# Patient Record
Sex: FEMALE | Race: WHITE | Employment: PART TIME | URBAN - METROPOLITAN AREA
[De-identification: names, ages, dates, MRNs, and addresses within clinical notes are randomized per-mention and may not be internally consistent; named-entity substitution may affect disease eponyms.]

---

## 2020-05-13 ENCOUNTER — HOSPITAL ENCOUNTER (INPATIENT)
Age: 50
LOS: 7 days | Discharge: HOME | DRG: 753 | End: 2020-05-20
Attending: PSYCHIATRY & NEUROLOGY | Admitting: PSYCHIATRY & NEUROLOGY
Payer: MEDICAID

## 2020-05-13 ENCOUNTER — HOSPITAL ENCOUNTER (EMERGENCY)
Age: 50
Discharge: PSYCHIATRIC OR SUBSTANCE USE FACILITY | End: 2020-05-13
Attending: EMERGENCY MEDICINE
Payer: MEDICAID

## 2020-05-13 VITALS
SYSTOLIC BLOOD PRESSURE: 102 MMHG | HEIGHT: 62 IN | RESPIRATION RATE: 18 BRPM | OXYGEN SATURATION: 95 % | TEMPERATURE: 98 F | WEIGHT: 130 LBS | BODY MASS INDEX: 23.92 KG/M2 | HEART RATE: 92 BPM | DIASTOLIC BLOOD PRESSURE: 71 MMHG

## 2020-05-13 DIAGNOSIS — R45.851 DEPRESSION WITH SUICIDAL IDEATION: Primary | ICD-10-CM

## 2020-05-13 DIAGNOSIS — F32.A DEPRESSION WITH SUICIDAL IDEATION: Primary | ICD-10-CM

## 2020-05-13 DIAGNOSIS — F31.62 BIPOLAR DISORDER, CURRENT EPISODE MIXED, MODERATE (HCC): ICD-10-CM

## 2020-05-13 LAB
ALBUMIN SERPL-MCNC: 3.8 G/DL (ref 3.4–5)
ALBUMIN/GLOB SERPL: 1.2 {RATIO} (ref 0.8–1.7)
ALP SERPL-CCNC: 106 U/L (ref 45–117)
ALT SERPL-CCNC: 45 U/L (ref 13–56)
AMPHET UR QL SCN: NEGATIVE
ANION GAP SERPL CALC-SCNC: 7 MMOL/L (ref 3–18)
APPEARANCE UR: CLEAR
AST SERPL-CCNC: 39 U/L (ref 10–38)
BACTERIA URNS QL MICRO: ABNORMAL /HPF
BARBITURATES UR QL SCN: NEGATIVE
BASOPHILS # BLD: 0 K/UL (ref 0–0.1)
BASOPHILS NFR BLD: 1 % (ref 0–2)
BENZODIAZ UR QL: NEGATIVE
BILIRUB SERPL-MCNC: 0.2 MG/DL (ref 0.2–1)
BILIRUB UR QL: NEGATIVE
BUN SERPL-MCNC: 5 MG/DL (ref 7–18)
BUN/CREAT SERPL: 7 (ref 12–20)
CALCIUM SERPL-MCNC: 8.4 MG/DL (ref 8.5–10.1)
CANNABINOIDS UR QL SCN: NEGATIVE
CHLORIDE SERPL-SCNC: 106 MMOL/L (ref 100–111)
CO2 SERPL-SCNC: 25 MMOL/L (ref 21–32)
COCAINE UR QL SCN: POSITIVE
COLOR UR: YELLOW
COVID-19 RAPID TEST, COVR: NOT DETECTED
CREAT SERPL-MCNC: 0.69 MG/DL (ref 0.6–1.3)
DIFFERENTIAL METHOD BLD: ABNORMAL
EOSINOPHIL # BLD: 0.1 K/UL (ref 0–0.4)
EOSINOPHIL NFR BLD: 2 % (ref 0–5)
EPITH CASTS URNS QL MICRO: ABNORMAL /LPF (ref 0–5)
ERYTHROCYTE [DISTWIDTH] IN BLOOD BY AUTOMATED COUNT: 14.6 % (ref 11.6–14.5)
ETHANOL SERPL-MCNC: 98 MG/DL (ref 0–3)
GLOBULIN SER CALC-MCNC: 3.3 G/DL (ref 2–4)
GLUCOSE SERPL-MCNC: 85 MG/DL (ref 74–99)
GLUCOSE UR STRIP.AUTO-MCNC: NEGATIVE MG/DL
HCG UR QL: NEGATIVE
HCT VFR BLD AUTO: 38.1 % (ref 35–45)
HDSCOM,HDSCOM: ABNORMAL
HGB BLD-MCNC: 12.4 G/DL (ref 12–16)
HGB UR QL STRIP: ABNORMAL
KETONES UR QL STRIP.AUTO: NEGATIVE MG/DL
LEUKOCYTE ESTERASE UR QL STRIP.AUTO: NEGATIVE
LYMPHOCYTES # BLD: 1.8 K/UL (ref 0.9–3.6)
LYMPHOCYTES NFR BLD: 31 % (ref 21–52)
MCH RBC QN AUTO: 29.4 PG (ref 24–34)
MCHC RBC AUTO-ENTMCNC: 32.5 G/DL (ref 31–37)
MCV RBC AUTO: 90.3 FL (ref 74–97)
METHADONE UR QL: NEGATIVE
MONOCYTES # BLD: 0.4 K/UL (ref 0.05–1.2)
MONOCYTES NFR BLD: 6 % (ref 3–10)
NEUTS SEG # BLD: 3.4 K/UL (ref 1.8–8)
NEUTS SEG NFR BLD: 60 % (ref 40–73)
NITRITE UR QL STRIP.AUTO: NEGATIVE
OPIATES UR QL: NEGATIVE
PCP UR QL: NEGATIVE
PH UR STRIP: 5.5 [PH] (ref 5–8)
PLATELET # BLD AUTO: 278 K/UL (ref 135–420)
PMV BLD AUTO: 8.9 FL (ref 9.2–11.8)
POTASSIUM SERPL-SCNC: 3.3 MMOL/L (ref 3.5–5.5)
PROT SERPL-MCNC: 7.1 G/DL (ref 6.4–8.2)
PROT UR STRIP-MCNC: NEGATIVE MG/DL
RBC # BLD AUTO: 4.22 M/UL (ref 4.2–5.3)
RBC #/AREA URNS HPF: ABNORMAL /HPF (ref 0–5)
SODIUM SERPL-SCNC: 138 MMOL/L (ref 136–145)
SOURCE, COVRS: NORMAL
SP GR UR REFRACTOMETRY: 1.01 (ref 1–1.03)
UROBILINOGEN UR QL STRIP.AUTO: 0.2 EU/DL (ref 0.2–1)
WBC # BLD AUTO: 5.6 K/UL (ref 4.6–13.2)
WBC URNS QL MICRO: ABNORMAL /HPF (ref 0–4)

## 2020-05-13 PROCEDURE — 81001 URINALYSIS AUTO W/SCOPE: CPT

## 2020-05-13 PROCEDURE — 74011250637 HC RX REV CODE- 250/637: Performed by: EMERGENCY MEDICINE

## 2020-05-13 PROCEDURE — 85025 COMPLETE CBC W/AUTO DIFF WBC: CPT

## 2020-05-13 PROCEDURE — 80053 COMPREHEN METABOLIC PANEL: CPT

## 2020-05-13 PROCEDURE — 80307 DRUG TEST PRSMV CHEM ANLYZR: CPT

## 2020-05-13 PROCEDURE — 99285 EMERGENCY DEPT VISIT HI MDM: CPT

## 2020-05-13 PROCEDURE — 81025 URINE PREGNANCY TEST: CPT

## 2020-05-13 PROCEDURE — 65220000003 HC RM SEMIPRIVATE PSYCH

## 2020-05-13 PROCEDURE — 87635 SARS-COV-2 COVID-19 AMP PRB: CPT

## 2020-05-13 PROCEDURE — 74011250637 HC RX REV CODE- 250/637: Performed by: PSYCHIATRY & NEUROLOGY

## 2020-05-13 RX ORDER — BENZTROPINE MESYLATE 1 MG/1
1 TABLET ORAL
Status: DISCONTINUED | OUTPATIENT
Start: 2020-05-13 | End: 2020-05-20 | Stop reason: HOSPADM

## 2020-05-13 RX ORDER — OLANZAPINE 5 MG/1
5 TABLET ORAL EVERY EVENING
Status: DISCONTINUED | OUTPATIENT
Start: 2020-05-13 | End: 2020-05-13 | Stop reason: HOSPADM

## 2020-05-13 RX ORDER — FLUPHENAZINE HYDROCHLORIDE 2.5 MG/ML
5 INJECTION, SOLUTION INTRAMUSCULAR
Status: DISCONTINUED | OUTPATIENT
Start: 2020-05-13 | End: 2020-05-20 | Stop reason: HOSPADM

## 2020-05-13 RX ORDER — FLUPHENAZINE HYDROCHLORIDE 5 MG/1
5 TABLET ORAL
Status: DISCONTINUED | OUTPATIENT
Start: 2020-05-13 | End: 2020-05-20 | Stop reason: HOSPADM

## 2020-05-13 RX ORDER — TRAZODONE HYDROCHLORIDE 50 MG/1
50 TABLET ORAL
Status: DISCONTINUED | OUTPATIENT
Start: 2020-05-13 | End: 2020-05-13 | Stop reason: HOSPADM

## 2020-05-13 RX ORDER — LORAZEPAM 1 MG/1
1 TABLET ORAL
Status: DISCONTINUED | OUTPATIENT
Start: 2020-05-13 | End: 2020-05-13 | Stop reason: HOSPADM

## 2020-05-13 RX ORDER — HYDROXYZINE PAMOATE 25 MG/1
25-50 CAPSULE ORAL
Status: DISCONTINUED | OUTPATIENT
Start: 2020-05-13 | End: 2020-05-13

## 2020-05-13 RX ORDER — IBUPROFEN 400 MG/1
800 TABLET ORAL
Status: DISCONTINUED | OUTPATIENT
Start: 2020-05-13 | End: 2020-05-20 | Stop reason: HOSPADM

## 2020-05-13 RX ORDER — HYDROXYZINE PAMOATE 50 MG/1
50 CAPSULE ORAL
Status: DISCONTINUED | OUTPATIENT
Start: 2020-05-13 | End: 2020-05-20 | Stop reason: HOSPADM

## 2020-05-13 RX ORDER — BENZTROPINE MESYLATE 1 MG/ML
1 INJECTION INTRAMUSCULAR; INTRAVENOUS
Status: DISCONTINUED | OUTPATIENT
Start: 2020-05-13 | End: 2020-05-20 | Stop reason: HOSPADM

## 2020-05-13 RX ADMIN — LORAZEPAM 1 MG: 1 TABLET ORAL at 11:14

## 2020-05-13 RX ADMIN — HYDROXYZINE PAMOATE 50 MG: 50 CAPSULE ORAL at 18:19

## 2020-05-13 NOTE — H&P
History and Physical        Patient: Jose Daniel Kumar               Sex: female          DOA: 5/13/2020         YOB: 1970      Age:  48 y.o.        LOS:  LOS: 0 days        HPI:     Jose Daniel Kumar is a 48 y.o.  female who was admitted expereincing depression   and suicidal ideation after an argument with family members. Active Problems:    Depression (5/13/2020)        Past Medical History:   Diagnosis Date    Anemia     Bipolar 1 disorder ([facility] [address] [facility]     Depression        No past surgical history on file. No family history on file. Social History     Socioeconomic History    Marital status: UNKNOWN     Spouse name: Not on file    Number of children: Not on file    Years of education: Not on file    Highest education level: Not on file   Tobacco Use    Smoking status: Never Smoker    Smokeless tobacco: Never Used   Substance and Sexual Activity    Alcohol use: Yes    Drug use: Yes     Types: Cocaine    Sexual activity: Never   Social History Narrative    ** Merged History Encounter **            Prior to Admission medications    Not on File       Allergies   Allergen Reactions    Erythromycin Nausea and Vomiting    Erythromycin Nausea Only       Review of Systems  A comprehensive review of systems was negative except for that written in the History of Present Illness. Physical Exam:      Visit Vitals  /73 (BP 1 Location: Left arm, BP Patient Position: Sitting)   Pulse (!) 107   Temp 97.7 °F (36.5 °C)   Resp 18       Physical Exam:  Physical Exam:   General:  Alert, cooperative, no distress, appears stated age. Eyes:  Conjunctivae/corneas clear. PERRL, EOMs intact. Fundi benign   Ears:  Normal TMs and external ear canals both ears. Nose: Nares normal. Septum midline. Mucosa normal. No drainage or sinus tenderness.    Mouth/Throat: Lips, mucosa, and tongue normal. Teeth and gums normal.   Neck: Supple, symmetrical, trachea midline, no adenopathy, thyroid: no enlargement/tenderness/nodules, no carotid bruit and no JVD. Back:   Symmetric, no curvature. ROM normal. No CVA tenderness. Lungs:   Clear to auscultation bilaterally. Heart:  Regular rate and rhythm, S1, S2 normal, no murmur, click, rub or gallop. Abdomen:   Soft, non-tender. Bowel sounds normal. No masses,  No organomegaly. Extremities: Extremities normal, atraumatic, no cyanosis or edema. Pulses: 2+ and symmetric all extremities. Skin: Skin color, texture, turgor normal. No rashes or lesions   Lymph nodes: Cervical, supraclavicular, and axillary nodes normal.   Neurologic: CNII-XII intact. Normal strength, sensation and reflexes throughout. Assessment/Plan     Patient was pleasant and cooperative during exam. Plan is for patient to participate in unit activities,  Take medications as prescribed and follow all discharge orders.

## 2020-05-13 NOTE — PROGRESS NOTES
Called [facility] and spoke with Albina. States just came out of meeting and will call back. Called [facility] ad spoke with Fede Coker and states doesn't accept straight Medicaid.

## 2020-05-13 NOTE — PROGRESS NOTES
Call placed to [facility] and referred pt, spoke with Gabby Hensley. Pt information faxed.

## 2020-05-13 NOTE — ED NOTES
History: Patient sinus exam  Patient stable awake alert no distress in the emergency department awaiting psychiatric consultation    Discussed information with a psychiatrist at 9 AM  He requested patient get Zyprexa and also be admitted voluntarily    ---      Vitals:  Patient Vitals for the past 12 hrs:   Temp Pulse Resp BP SpO2   05/13/20 1256  92 18 102/71 95 %   05/13/20 1107 98 °F (36.7 °C) (!) 110 18 105/67 99 %   05/13/20 0829 98.1 °F (36.7 °C) (!) 105 18 108/67 97 %   05/13/20 0455 97.4 °F (36.3 °C) (!) 104 18 (!) 141/104 98 %       Medications ordered:   Medications   lurasidone (LATUDA) tablet 40 mg (40 mg Oral Refused 5/13/20 1329)   LORazepam (ATIVAN) tablet 1 mg (1 mg Oral Given 5/13/20 1114)   traZODone (DESYREL) tablet 50 mg (has no administration in time range)   OLANZapine (ZyPREXA) tablet 5 mg (has no administration in time range)         Progress notes, Consult notes or Re-evaluation:   I evaluate the patient at 9:07 AM  Patient is awake alert no distress no vomiting no diarrhea no chest pain or shortness of breath  She is agreeable with the transfer she wanted to make sure that we knew that the [place] [address] psychiatric facility could not keep her more than 3 days but she needed to stay longer. I will communicate that with the case management as well  Displacement consultation has been placed patient seen this morning. Patient is calm awake alert cooperative no distress no other additional medication requested  Placed Zyprexa has been ordered for tonight patient is still here in our emergency department.   ----    Diagnostic Study Results     Labs -     Recent Results (from the past 12 hour(s))   CBC WITH AUTOMATED DIFF    Collection Time: 05/13/20  5:20 AM   Result Value Ref Range    WBC 5.6 4.6 - 13.2 K/uL    RBC 4.22 4.20 - 5.30 M/uL    HGB 12.4 12.0 - 16.0 g/dL    HCT 38.1 35.0 - 45.0 %    MCV 90.3 74.0 - 97.0 FL    MCH 29.4 24.0 - 34.0 PG    MCHC 32.5 31.0 - 37.0 g/dL    RDW 14.6 (H) 11.6 - 14.5 %    PLATELET 042 404 - 638 K/uL    MPV 8.9 (L) 9.2 - 11.8 FL    NEUTROPHILS 60 40 - 73 %    LYMPHOCYTES 31 21 - 52 %    MONOCYTES 6 3 - 10 %    EOSINOPHILS 2 0 - 5 %    BASOPHILS 1 0 - 2 %    ABS. NEUTROPHILS 3.4 1.8 - 8.0 K/UL    ABS. LYMPHOCYTES 1.8 0.9 - 3.6 K/UL    ABS. MONOCYTES 0.4 0.05 - 1.2 K/UL    ABS. EOSINOPHILS 0.1 0.0 - 0.4 K/UL    ABS. BASOPHILS 0.0 0.0 - 0.1 K/UL    DF AUTOMATED     METABOLIC PANEL, COMPREHENSIVE    Collection Time: 05/13/20  5:20 AM   Result Value Ref Range    Sodium 138 136 - 145 mmol/L    Potassium 3.3 (L) 3.5 - 5.5 mmol/L    Chloride 106 100 - 111 mmol/L    CO2 25 21 - 32 mmol/L    Anion gap 7 3.0 - 18 mmol/L    Glucose 85 74 - 99 mg/dL    BUN 5 (L) 7.0 - 18 MG/DL    Creatinine 0.69 0.6 - 1.3 MG/DL    BUN/Creatinine ratio 7 (L) 12 - 20      GFR est AA >60 >60 ml/min/1.73m2    GFR est non-AA >60 >60 ml/min/1.73m2    Calcium 8.4 (L) 8.5 - 10.1 MG/DL    Bilirubin, total 0.2 0.2 - 1.0 MG/DL    ALT (SGPT) 45 13 - 56 U/L    AST (SGOT) 39 (H) 10 - 38 U/L    Alk.  phosphatase 106 45 - 117 U/L    Protein, total 7.1 6.4 - 8.2 g/dL    Albumin 3.8 3.4 - 5.0 g/dL    Globulin 3.3 2.0 - 4.0 g/dL    A-G Ratio 1.2 0.8 - 1.7     ETHYL ALCOHOL    Collection Time: 05/13/20  5:20 AM   Result Value Ref Range    ALCOHOL(ETHYL),SERUM 98 (H) 0 - 3 MG/DL   URINALYSIS W/ RFLX MICROSCOPIC    Collection Time: 05/13/20  5:20 AM   Result Value Ref Range    Color YELLOW      Appearance CLEAR      Specific gravity 1.008 1.005 - 1.030      pH (UA) 5.5 5.0 - 8.0      Protein Negative NEG mg/dL    Glucose Negative NEG mg/dL    Ketone Negative NEG mg/dL    Bilirubin Negative NEG      Blood TRACE (A) NEG      Urobilinogen 0.2 0.2 - 1.0 EU/dL    Nitrites Negative NEG      Leukocyte Esterase Negative NEG     DRUG SCREEN, URINE    Collection Time: 05/13/20  5:20 AM   Result Value Ref Range    BENZODIAZEPINES Negative NEG      BARBITURATES Negative NEG      THC (TH-CANNABINOL) Negative NEG      OPIATES Negative NEG PCP(PHENCYCLIDINE) Negative NEG      COCAINE Positive (A) NEG      AMPHETAMINES Negative NEG      METHADONE Negative NEG      HDSCOM (NOTE)    URINE MICROSCOPIC ONLY    Collection Time: 05/13/20  5:20 AM   Result Value Ref Range    WBC 0 to 3 0 - 4 /hpf    RBC 0 to 3 0 - 5 /hpf    Epithelial cells FEW 0 - 5 /lpf    Bacteria 2+ (A) NEG /hpf   SARS-COV-2    Collection Time: 05/13/20 11:15 AM   Result Value Ref Range    Specimen source Nasopharyngeal      COVID-19 rapid test Not detected NOTD         Radiologic Studies -   No orders to display     CT Results  (Last 48 hours)    None        CXR Results  (Last 48 hours)    None        4:36 PM  Patient awake alert no distress patient's transfer form to [facility] has been filled out patient is awaiting transportation. No complaints well-appearing no distress positive p.o. intake in the emergency department    Discharge     Clinical Impression:   1. Depression with suicidal ideation    2.  Bipolar disorder, current episode mixed, moderate (HCC)        Disposition:  Transfer to psychiatric facility, voluntary admission    Follow-up Information    None

## 2020-05-13 NOTE — PROGRESS NOTES
Pt accepted by Dr Harry Myers at [facility]. Pt is going to Room 113 Bed 2. Nursing to call report to [phone]. ED MD, charge nurse, pt's nurse made aware. Tried to let pt know but sleep, pt was given Ativan earlier.

## 2020-05-13 NOTE — ED TRIAGE NOTES
Hx BiPolar disorder, off medication x 48hrs. Thoughts of driving car off the road. Looking for voluntary admission for rehab. Reports self medicating. Denies HI.

## 2020-05-13 NOTE — ED PROVIDER NOTES
Nathaniel Temple is a 48 y.o. female with history of bipolar disorder who is been having to include a plan of driving her car wreck left the last couple days along with decreased sleeping increased depression. Patient has been off her medications for the last 48 hours as well. She also admits to alcohol and other drug use. Patient has not been admitted in a while. She denies any hallucinations. Patient was on Latuda at 40 mg but thought that was too high dose because it made her shake. Patient is also recently prescribed trazodone but has not started taking that is yet. Patient is requesting evaluation for possible admission    The history is provided by the patient and medical records. Past Medical History:   Diagnosis Date    Anemia     [facility]     Depression        History reviewed. No pertinent surgical history. History reviewed. No pertinent family history. Social History     Socioeconomic History    Marital status: UNKNOWN     Spouse name: Not on file    Number of children: Not on file    Years of education: Not on file    Highest education level: Not on file   Occupational History    Not on file   Social Needs    Financial resource strain: Not on file    Food insecurity     Worry: Not on file     Inability: Not on file    Transportation needs     Medical: Not on file     Non-medical: Not on file   Tobacco Use    Smoking status: Never Smoker    Smokeless tobacco: Never Used   Substance and Sexual Activity    Alcohol use:  Yes    Drug use: Yes     Types: Cocaine    Sexual activity: Never   Lifestyle    Physical activity     Days per week: Not on file     Minutes per session: Not on file    Stress: Not on file   Relationships    Social connections     Talks on phone: Not on file     Gets together: Not on file     Attends Buddhist service: Not on file     Active member of club or organization: Not on file     Attends meetings of clubs or organizations: Not on file     Relationship status: Not on file    Intimate partner violence     Fear of current or ex partner: Not on file     Emotionally abused: Not on file     Physically abused: Not on file     Forced sexual activity: Not on file   Other Topics Concern    Not on file   Social History Narrative    ** Merged History Encounter **              ALLERGIES: Erythromycin and Erythromycin    Review of Systems   Constitutional: Negative for fever. HENT: Negative for sore throat and trouble swallowing. Eyes: Negative for visual disturbance. Respiratory: Negative for shortness of breath. Cardiovascular: Negative for chest pain. Genitourinary: Negative for difficulty urinating. Musculoskeletal: Negative for gait problem. Skin: Negative for rash. Allergic/Immunologic: Negative for immunocompromised state. Neurological: Negative for syncope. Psychiatric/Behavioral: Positive for sleep disturbance and suicidal ideas. The patient is nervous/anxious. Vitals:    05/13/20 0455   BP: (!) 141/104   Pulse: (!) 104   Resp: 18   Temp: 97.4 °F (36.3 °C)   SpO2: 98%   Weight: 59 kg (130 lb)   Height: 5' 2\" (1.575 m)            Physical Exam  Vitals signs and nursing note reviewed. Constitutional:       General: She is in acute distress. Appearance: She is well-developed. She is not ill-appearing, toxic-appearing or diaphoretic. HENT:      Head: Normocephalic and atraumatic. Right Ear: External ear normal.      Left Ear: External ear normal.      Nose: Nose normal.      Mouth/Throat:      Pharynx: Uvula midline. Eyes:      General: No scleral icterus. Conjunctiva/sclera: Conjunctivae normal.   Neck:      Musculoskeletal: Neck supple. Cardiovascular:      Rate and Rhythm: Normal rate and regular rhythm. Heart sounds: Normal heart sounds. Pulmonary:      Effort: Pulmonary effort is normal.      Breath sounds: Normal breath sounds. Abdominal:      Palpations: Abdomen is soft. Tenderness: There is no abdominal tenderness. Skin:     General: Skin is warm and dry. Capillary Refill: Capillary refill takes less than 2 seconds. Neurological:      Mental Status: She is alert and oriented to person, place, and time. Gait: Gait normal.   Psychiatric:         Attention and Perception: Attention normal.         Mood and Affect: Mood is anxious and depressed. Speech: Speech normal.         Behavior: Behavior normal. Behavior is cooperative. Thought Content: Thought content includes suicidal ideation. Thought content includes suicidal plan. MDM       Procedures  Vitals:  Patient Vitals for the past 12 hrs:   Temp Pulse Resp BP SpO2   05/13/20 0455 97.4 °F (36.3 °C) (!) 104 18 (!) 141/104 98 %         Medications ordered:   Medications   OTHER(NON-FORMULARY) 20 mg (has no administration in time range)   LORazepam (ATIVAN) tablet 1 mg (has no administration in time range)   traZODone (DESYREL) tablet 50 mg (has no administration in time range)         Lab findings:  No results found for this or any previous visit (from the past 12 hour(s)). EKG interpretation by ED Physician:      X-Ray, CT or other radiology findings or impressions:  No orders to display       Progress notes, Consult notes or additional Procedure notes: We will get screening labs and order medications  Will turn over to Dr. Ashli Yu at approximate 6 AM to follow-up on labs and to get tele-psychiatry evaluation    Reevaluation of patient:   Stable    Disposition:  Diagnosis:   1. Depression with suicidal ideation    2. Bipolar disorder, current episode mixed, moderate (HCC)        Disposition: Pending    Follow-up Information    None           Patient's Medications   Start Taking    No medications on file   Continue Taking    No medications on file   These Medications have changed    No medications on file   Stop Taking    FERROUS FUMARATE 325 MG (106 MG IRON) TAB    Take  by mouth.  3 times daily    HYDROXYZINE (VISTARIL) 25 MG CAPSULE    Take 25 mg by mouth three (3) times daily as needed for Itching. LITHIUM 300 MG TABLET    Take 1 Tab by mouth two (2) times a day. LITHIUM CARBONATE PO    Take  by mouth. RISPERIDONE (RISPERDAL) 1 MG TABLET    Take 1 Tab by mouth nightly. RISPERIDONE (RISPERDAL) 1 MG TABLET    Take  by mouth daily.      s

## 2020-05-13 NOTE — PROGRESS NOTES
Referred pt to [facility] and spoke to Juma Martell. Pt information faxed.

## 2020-05-13 NOTE — ED NOTES
TRANSFER - OUT REPORT:    Verbal report given to Eleanor(name) on Mariana Gandhi  being transferred to [facility]) for routine progression of care       Report consisted of patients Situation, Background, Assessment and   Recommendations(SBAR). Information from the following report(s) SBAR, ED Summary and MAR was reviewed with the receiving nurse. Lines:       Opportunity for questions and clarification was provided.       Patient awaiting transport

## 2020-05-13 NOTE — ED NOTES
Received report from the nurse, patient is sleeping, sitter in the room recording 15 minute checks.  Will obtain VS at 0900

## 2020-05-13 NOTE — CONSULTS
Name: Melinda Childers      : 1970  Date: 2020      Time: 8:30a  Location of patient: [facility] ED  Location of doctor: [facility]   This evaluation was conducted via telepsychiatry with the assistance of onsite staff    Chief Complaint: SI with plan to drive car off bridge  History of Present Illness: 47 y/o female, living in [facility] for cocaine abuse, unemployed, h/o bipolar d/o, h/o inpatient, h/o SI but denies attempts, denies h/o HI/violence, who presents to ED reporting increased depression and SI with plan to drive car off bridge in the context of relapse on cocaine, issues with family, and worsening depression. Patient reports feeling like an external force was pushing me to drive off a bridge.  Patient reports she is motivated for treatment and recently restarted Latuda but could not take the full dose because of muscle cramping. Patient does not feel safe for discharge and would like to be stabilized on psych unit and then follow up with 30 or 60-day rehab.   SI/attempts/Self harm: recent SI  last with plans to drive car of a bridge; no h/o attempts  HI/Violence: denies  Trauma history: denies  Sex/human trafficking: denies all  Access to guns: denies  Legal: denies  Psychiatric History/Treatment History: h/o bipolar; h/o inpatient   Drug/Alcohol History: abusing cocaine; no h/o rehab  Medical History:   Medications & Freq: Latuda Rx for 40mg  but taking 10-20mg  Allergies: erythromycin  Sleep: Quantity: 8h Quality: reports sleeping well at [facility] house  Family Psych History/History of suicide: no attempts; mat GM - bipolar  Social History: has been living in [facility]   Relationship status: single  no relationship   Employment: unemployed   Education: college grad   Stressors: severe  estranged from family   Strengths/supports: motivated for treatment  Mental Status Exam:   Appearance and attire: unkempt, hospital attire  Attitude and behavior: cooperative  Speech: normal rate and rhythm  Affect and mood: depressed, hopeless, helpless  Association and thought processes: circumstantial but logical  Thought content: denies current SI, SI last night with plan to drive car off bridge; no HI; reports feeling like an external force was pushing me to drive off a bridge  Perception: denies AH/VH  Sensorium, memory, and orientation: awake and alert  Intellectual functioning: average  Insight and judgment: impaired  Impression/Risk Assessment: 49 y/o female, living in correction house for cocaine abuse, unemployed, h/o bipolar d/o, h/o inpatient, h/o SI but denies attempts, denies h/o HI/violence, who presents to ED reporting increased depression and SI with plan to drive car off bridge in the context of relapse on cocaine, issues with family, and worsening depression. Patient remains hopeless, helpless, depressed, and unable to contract for safety. Patient appears at increased risk to harm self and requires inpatient psych admission for safety and stabilization.   Diagnosis: bipolar d/o depressed with possible psychotic features; cocaine use d/o  Treatment Recommendations: voluntary inpatient psych admission  dual diagnosis if available  Pharmacological: if patient in ED overnight then would start Zyprexa 5mg qhs  Therapy: supportive and substance abuse focused  Level of Care: inpatient

## 2020-05-13 NOTE — PROGRESS NOTES
Received a call from [facility] Mann and states that Dr Loulou Martines accepted pt pending COVID testing. Blaise Spring, ED director, will talk to ED MD and pt's nurse.

## 2020-05-14 PROBLEM — F31.5 BIPOLAR I DISORDER, MOST RECENT EPISODE DEPRESSED, SEVERE WITH PSYCHOTIC FEATURES (HCC): Chronic | Status: ACTIVE | Noted: 2020-05-13

## 2020-05-14 PROBLEM — F10.10 ALCOHOL USE DISORDER, MILD, ABUSE: Chronic | Status: ACTIVE | Noted: 2020-05-14

## 2020-05-14 PROBLEM — F14.20 COCAINE USE DISORDER, SEVERE, DEPENDENCE (HCC): Chronic | Status: ACTIVE | Noted: 2020-05-14

## 2020-05-14 PROBLEM — N92.1 MENORRHAGIA WITH IRREGULAR CYCLE: Chronic | Status: ACTIVE | Noted: 2020-05-14

## 2020-05-14 PROBLEM — E87.6 HYPOKALEMIA: Status: ACTIVE | Noted: 2020-05-14

## 2020-05-14 LAB
CHOLEST SERPL-MCNC: 130 MG/DL
HBA1C MFR BLD: 4.9 % (ref 4.2–5.6)
HDLC SERPL-MCNC: 65 MG/DL (ref 40–60)
HDLC SERPL: 2 {RATIO} (ref 0–5)
LDLC SERPL CALC-MCNC: 49.8 MG/DL (ref 0–100)
LIPID PROFILE,FLP: ABNORMAL
TRIGL SERPL-MCNC: 76 MG/DL (ref ?–150)
TSH SERPL DL<=0.05 MIU/L-ACNC: 0.57 UIU/ML (ref 0.36–3.74)
VLDLC SERPL CALC-MCNC: 15.2 MG/DL

## 2020-05-14 PROCEDURE — 80061 LIPID PANEL: CPT

## 2020-05-14 PROCEDURE — 83036 HEMOGLOBIN GLYCOSYLATED A1C: CPT

## 2020-05-14 PROCEDURE — 84443 ASSAY THYROID STIM HORMONE: CPT

## 2020-05-14 PROCEDURE — 74011250637 HC RX REV CODE- 250/637: Performed by: PSYCHIATRY & NEUROLOGY

## 2020-05-14 PROCEDURE — 36415 COLL VENOUS BLD VENIPUNCTURE: CPT

## 2020-05-14 PROCEDURE — 65220000003 HC RM SEMIPRIVATE PSYCH

## 2020-05-14 RX ORDER — POTASSIUM CHLORIDE 750 MG/1
10 TABLET, FILM COATED, EXTENDED RELEASE ORAL DAILY
Status: DISCONTINUED | OUTPATIENT
Start: 2020-05-14 | End: 2020-05-15

## 2020-05-14 RX ORDER — NALTREXONE HYDROCHLORIDE 50 MG/1
50 TABLET, FILM COATED ORAL DAILY
Status: DISCONTINUED | OUTPATIENT
Start: 2020-05-16 | End: 2020-05-20 | Stop reason: HOSPADM

## 2020-05-14 RX ORDER — BUPROPION HYDROCHLORIDE 100 MG/1
100 TABLET, EXTENDED RELEASE ORAL DAILY
Status: DISCONTINUED | OUTPATIENT
Start: 2020-05-15 | End: 2020-05-20 | Stop reason: HOSPADM

## 2020-05-14 RX ADMIN — POTASSIUM CHLORIDE 10 MEQ: 750 TABLET, FILM COATED, EXTENDED RELEASE ORAL at 19:52

## 2020-05-14 RX ADMIN — HYDROXYZINE PAMOATE 50 MG: 50 CAPSULE ORAL at 18:37

## 2020-05-14 NOTE — BSMART NOTE
[facility] Biopsychosocial Assessment Current Level of Psychosocial Functioning  
 
[x]Independent 
[]Dependent []Minimal Assist 
 
 
Comments:   
 
Psychosocial High Risk Factors (check all that apply) []Unable to obtain meds []Chronic illness/pain   
[x]Substance abuse  
[x]Lack of Family Support [x]Financial stress [x]Isolation []Inadequate Community Resources 
[]Suicide attempt(s) [x]Not taking medications []Victim of crime []Developmental Delay 
[x]Unable to manage personal needs []Age 72 or older  
[x]  Homeless []Chanda transportation []Readmission within 30 days [x]Unemployment []Traumatic Event Psychiatric Advanced Directive: Not clarified. Family to involve in treatment: Estranged from most of them Sexual Orientation:  heterosexual 
 
Patient Strengths: college grad, articulate / bright, still has motivation to change & get back in track Patient Barriers: inconsistency, expectations / plan not always that clear needs to recommit to \"recovery\" Opiate education provided: will be addressed in all phases of the program 
 
Safety plan: needs to reestablish trust, plan must be specific, pt must be compliant CMHC/MH history: see Dr, Crisis & nursing admission note: 
 
Plan of Care: 
medication management, group/individual therapies, family meetings, psycho -education, treatment team meetings to assist with stabilization Initial Discharge Plan:  Comply with [facility] where ever she may be staying Clinical Summary:  Pt is a 48 y.o. female with history of bipolar disorder who is been having a plan to harm self to include driving her car off a bridge the last couple days along with decreased sleeping increased depression.   Patient has been off her medications for the last 48 hours as well.  She also admits to alcohol and relapse with cocaine as well as family issues. Patient has not been admitted in a while. She denies any hallucinations. Patient was on Latuda at 40 mg but thought that was too high dose because it made her shake. Patient is also recently prescribed trazodone but has not started taking that is yet. She was staying at a [facility]. Assessment / Intervention; When introducing self to pt and explaining my role on tx team, pt was lethargic, resting in bed. Information provided was consistent with all above. No new self disclosure. Reminded pt to attend groups & activities to provide her support, relief & direction. Dr & tx team updated

## 2020-05-14 NOTE — GROUP NOTE
JAIME  GROUP DOCUMENTATION INDIVIDUAL Group Therapy Note Date: 5/14/2020 Group Start Time: 3219 Group End Time: 1640 Group Topic: Nursing [facility] ADULT CHEM Saadia Patten, RN 
 
JAIME  GROUP DOCUMENTATION GROUP Group Therapy Note Attendees: 3 Attendance: Did not attend Toni Sumner RN

## 2020-05-14 NOTE — GROUP NOTE
JAIME  GROUP DOCUMENTATION INDIVIDUAL Group Therapy Note Date: 5/13/2020 Group Start Time: 200 Group End Time: 1900 Group Topic: Nursing [facility] ADULT CHEM DEP Joselin Singer, RN 
 
JAIME  GROUP DOCUMENTATION GROUP Group Therapy Note Attendees: 5 Attendance: Did not attend group. Hannah Garrison

## 2020-05-14 NOTE — BSMART NOTE
OCCUPATIONAL THERAPY PROGRESS NOTE Group [id number] The patient declined group. Said she was still trying to get her \"head together\" and didn't feel well.

## 2020-05-14 NOTE — BH NOTES
Patient medicated with Vistaril 50mg po for visible signs of anxiety ; restlessness and notable hand tremors.

## 2020-05-14 NOTE — BSMART NOTE
SOCIAL WORK GROUP THERAPY PROGRESS NOTE Group Time:  10:30am 
 
Group Topic:  Coping Skills Group Participation:  
 
Pt reportedly did not attend group due to medical issues as she remains lethargic & wanted \"to sleep more\".

## 2020-05-14 NOTE — BH NOTES
Pt in milieu most of the shift although moments of  being loud byut not disruptive. Did participate in groups held this morning and was in room napping   this  afternoon.

## 2020-05-14 NOTE — BH NOTES
This 48year old female, admitted on voluntary status from [facility] with Hx of Bipolar Disorder. Patient accompanied to ACDU by hospital  and medics. Patient is currently expressing suicidal ideations with plan to drive her car off of the road. Patient states she is interested in substance treatment for cocaine use. She reports she was living in a [facility] house but was late returning at assigned time and was released, and is now homeless. Patient reports having a history of depression and has been off of medications for a few days. Patient is pleasant with dull affect, cooperative, and reports being able to \"incorporate the thoughts of others within her thoughts\". States she feels safe on the unit and contracts for safety. Patient denies HI/VH. Oriented to unit policies and to assigned room. Will continue to monitor and offer support as needed. RNs will initiate, develop, implement, review or revise treatment plan.

## 2020-05-15 PROCEDURE — 65220000003 HC RM SEMIPRIVATE PSYCH

## 2020-05-15 PROCEDURE — 74011250637 HC RX REV CODE- 250/637: Performed by: PSYCHIATRY & NEUROLOGY

## 2020-05-15 RX ORDER — LOPERAMIDE HYDROCHLORIDE 2 MG/1
2 CAPSULE ORAL
Status: DISCONTINUED | OUTPATIENT
Start: 2020-05-15 | End: 2020-05-20 | Stop reason: HOSPADM

## 2020-05-15 RX ORDER — THERA TABS 400 MCG
1 TAB ORAL DAILY
Status: DISCONTINUED | OUTPATIENT
Start: 2020-05-16 | End: 2020-05-20 | Stop reason: HOSPADM

## 2020-05-15 RX ADMIN — LURASIDONE HYDROCHLORIDE 20 MG: 20 TABLET, FILM COATED ORAL at 17:06

## 2020-05-15 RX ADMIN — HYDROXYZINE PAMOATE 50 MG: 50 CAPSULE ORAL at 17:36

## 2020-05-15 RX ADMIN — POTASSIUM CHLORIDE 10 MEQ: 750 TABLET, FILM COATED, EXTENDED RELEASE ORAL at 09:00

## 2020-05-15 RX ADMIN — MULTIPLE VITAMINS W/ MINERALS TAB 1 TABLET: TAB at 09:00

## 2020-05-15 RX ADMIN — LOPERAMIDE HYDROCHLORIDE 2 MG: 2 CAPSULE ORAL at 19:38

## 2020-05-15 NOTE — PROGRESS NOTES
Problem: Suicide  Goal: *STG: Remains safe in hospital  Description: Patient will not harm herself or others daily while in hospital.   Outcome: Progressing Towards Goal     Problem: Suicide  Goal: *STG/LTG: Complies with medication therapy  Description: Patient will comply with medications daily while hospitalized. Outcome: Progressing Towards Goal     Problem: Depressed Mood (Adult/Pediatric)  Goal: *STG: Participates in treatment plan  Description: Patient will participate in treatment planning daily while hospitalized. Outcome: Progressing Towards Goal   Patient appears anxious and guarded but does smile appropriately in conversation. Isolating  in room most of evening shift. Out of room for meals and medications. Reports feelings of depression. Denies +SI/HI/AH. Contracts for safety on unit. Will continue to monitor and offer support as needed.

## 2020-05-15 NOTE — BSMART NOTE
ART THERAPY GROUP PROGRESS NOTE Group time:8:45 The patient did not awaken/get up when called for group despite encouragement.

## 2020-05-15 NOTE — GROUP NOTE
[facility] GROUP DOCUMENTATION INDIVIDUAL Group Therapy Note Date: 5/15/2020 Group Start Time: 0066 Group End Time: 4787 Group Topic: Nursing [address] ADULT CHEM DEP Kaylan Graham RN 
 
[facility] GROUP DOCUMENTATION GROUP Group Therapy Note Attendees:   5 Attendance: DID NOT ATTEND Omid Cedillo RN

## 2020-05-15 NOTE — BSMART NOTE
SOCIAL WORK GROUP THERAPY PROGRESS NOTE Group Time:  10:30am 
 
Group Topic:  Coping Skills Group Participation: Pt was unavailable for group as continues to remain dysphoric and isolate in room despite reminders groups are helpful to establish realistic goals. Very little self disclosure.

## 2020-05-15 NOTE — BSMART NOTE
SW Contact:   
 
Clinical Summary:  Pt is a 48 y.o. female with history of bipolar disorder who is been having a plan to harm self to include driving her car off a bridge the last couple days along with decreased sleeping increased depression.  Patient has been off her medications for the last 48 hours as well.  She also admits to alcohol and relapse with cocaine as well as family issues. Natalya Monterroso has not been admitted in a while.  She denies any hallucinations.  Patient was on Latuda at 40 mg but thought that was too high dose because it made her shake.  Patient is also recently prescribed trazodone but has not started taking that is yet. She was staying at a [employer]. Also recently involved with [facility] for medication management. Assessment /Intervention; Today's contact was further clarity about information obtained from all assessments to help structure stronger tx plan. Pt still seems to minimize  / rationalize her need for groups & activities. Reminded her she needs to put energy into solutions & working a strong \"recovery\" program. Still vague about what willing to do outpt tx wise. ADDENDUM:  Pt given Fact Sheets & Phone #'s for: [facility]. .. \"[facility] [place]\" NOT TAKE pt insurance. Pt given task to INITIATE CONTACT this weekend & give update to her Dr. Frank & tx team given update.

## 2020-05-15 NOTE — PROGRESS NOTES
Behavioral Health Progress Note    Admit Date: 5/13/2020  Hospital day 2    Vitals : No data found. Labs:  No results found for this or any previous visit (from the past 24 hour(s)).   Meds:   Current Facility-Administered Medications   Medication Dose Route Frequency    [START ON 5/16/2020] therapeutic multivitamin (THERAGRAN) tablet 1 Tab  1 Tab Oral DAILY    lurasidone (LATUDA) tablet 20 mg  20 mg Oral DAILY WITH DINNER    buPROPion SR (WELLBUTRIN SR) tablet 100 mg  100 mg Oral DAILY    [START ON 5/16/2020] naltrexone (DEPADE) tablet 50 mg  50 mg Oral DAILY    hydrOXYzine pamoate (VISTARIL) capsule 50 mg  50 mg Oral TID PRN    ibuprofen (MOTRIN) tablet 800 mg  800 mg Oral Q6H PRN    benztropine (COGENTIN) injection 1 mg  1 mg IntraMUSCular Q6H PRN    benztropine (COGENTIN) tablet 1 mg  1 mg Oral Q6H PRN    fluPHENAZine (PROLIXIN) injection 5 mg  5 mg IntraMUSCular Q6H PRN    fluPHENAZine (PROLIXIN) tablet 5 mg  5 mg Oral Q6H PRN      Hospital Problems: Principal Problem:    Bipolar I disorder, most recent episode depressed, severe with psychotic features ([facility] (5/13/2020)    Active Problems:    Cocaine use disorder, severe, dependence ([facility] [address] [facility] (5/14/2020)      Alcohol use disorder, mild, abuse (5/14/2020)      Menorrhagia with irregular cycle (5/14/2020)      Hypokalemia (5/14/2020)        Subjective:   Medication side effects: dizziness/lightheadedness, fatigue/weakness  tremor    Mental Status Exam  Sensorium: alert  Orientation: only aware of  time, place and person  Relations: cooperative  Eye Contact: appropriate  Appearance: shows no evidence of impairment  Thought Process: normal rate of thoughts and fair abstract reasoning/computation   Thought Content: no evidence of impairment   Suicidal: passive ideas   Homicidal: none   Mood: is anxious and unhappy   Affect: stable  Memory: shows no evidence of impairment     Concentration: fair  Abstraction: concrete  Insight: The patient's insight shows no evidence of impairment    OR Fair  Judgement: is psychologically impaired OR  Fair    Assessment/Plan:   not changed  Was nauseous with diarrhea this am. Had not received Bupropion , was not available until tomorrow. Only received MVit with iron and potassium. Will stop the potassium and instead give foods high in potassium and stop the iron and use regular Vit. Will start the Bupropion in am. To resume Laruda with dinner. Still anxious, shaky, unhappy. Cannot return to [facility]. Discussed this with pt and SW who will look at [facility] program referrals.    Continue close observation,

## 2020-05-15 NOTE — H&P
[facility] HISTORY AND PHYSICAL    Name:  Ivania Lainez  MR#:   [id number]  :  1970  ACCOUNT #:  [de-identified]  ADMIT DATE:  2020    IDENTIFYING DATA:  The patient is a 51-year-old single white female, resident of [place], [place], who has been living in an [address] [place] [address]. She is unemployed and insured by [facility]. BASIS FOR ADMISSION:  The patient is admitted after presentation to [facility] Emergency Department. She presents with history of bipolar disorder, saying that she is having thoughts to drive her car and wreck it. She described decreased sleep, increased depression and had been off of her medicines for bipolar disorder for the last 48 hours. She said she had been drinking and using cocaine. She was supposed to have been on Latuda 40 mg with breakfast, but felt that it was too high because it was making her shake. She had been prescribed trazodone recently but was not taking this. She was endorsing continued suicidal ideas and inability to contract for safety. She was screened by the telepsychiatrist who reported same issues, describing her to have bipolar disorder with possible psychotic features and cocaine use disorder. There are no notes as to prior admissions, though she does appear that she had been seeing psychiatrist Dr. Sierra Moe in the past.  She had presented to [facility] with suicidality and description of having been on Lexapro and lithium on 2019. She subsequently was admitted to [facility]. The patient reports history of bipolar symptoms and cocaine use for greater than 10 years. She has several past admissions to [facility] for bipolar disorder. She has been on lithium, Depakote, Geodon and Abilify that she knows of. She had been followed by Dr. Sally Hussein, psychiatrist, for several years. She had lost her insurance.   She lost her job with the bipolar disorder and cocaine use. She had forged a check for money and went to retirement for one year about 7 years ago. She got out, violated her probation, went back to retirement for 6 months. She had been using cocaine off and on thereafter. She described manic episodes with anger. irritability, agitation as well as some episodes of mild euphoria. She described ideas of reference such as believing that people on TV are talking about her own feelings. She described intermittent depressive cycles. She did know that she had never been on bupropion for depression but did recall being on trazodone 100 mg at night for sleep and depression and having been on Vistaril for anxiety. She had been hospitalized as noted above and thereafter had briefly seen a nurse practitioner, but did not have insurance and thus was only taking medicines off and on. She did know that she had been on Latuda, but it seemed to have been too strong. Most recently, she had gone to a program called [facility] about one week ago, and they did a computer evaluation by nurse practitioner who placed her back on the Lowanda Faes, but she did not get the prescription filled. She said she felt increasingly anxious and was worried about taking the Lowanda Faes. She was living in an [address] [place] [address], but was continuing to use cocaine. She had missed the curfew as a result of the cocaine use and as a result presented to [facility] in agitated condition, threatening suicide. She said she had been smoking the crack cocaine for about 10 years. Her longest period of sobriety was about 12 months. Intermittently, she had worked 12-step program, but said it did not seem to be enough to hold her sober.   She had never gone to an inpatient substance abuse treatment program.  She did attend outpatient program at [address] [place] [address], [address] for 6 months, but said it was not very successful since none of the people there were really willing to work a program.  She was drinking about 12 ounces of wine a day. She was worried that when she had stopped the Latuda 40 mg, she was having episodes where she felt that she had to move her feet back and forth and was worried about this being a side effect of stopping Latuda or a side effect of the [place]. MEDICAL HISTORY:  Significant for menorrhagia for the past 2 months, says that she has had a low iron level in the past and had been described as being anemic. She is not sexually active. She denied food or drug allergies. LABORATORY TESTING:  In the emergency room, revealed a CBC with a low normal hemoglobin 12.4 g/dL and the remainder normal; dilute urine with a specific gravity 1.008, trace blood and 0-3 wbc's; comprehensive metabolic panel with low potassium 3.3 mmol/L, minimally low calcium 8.4 mg/dL and the remainder normal; normal lipid panel; negative hCG; urine drug screen positive for cocaine; alcohol level of intoxication with a blood alcohol level 98 mg/dL. A COVID-19 test was negative. SUBSTANCE ABUSE HISTORY:  Alcohol history is as above, and she denies having significant drinking problem, though she was drinking 12 ounces of wine a day. She denied DUI or drunk in public. She denied other drugs other than the cocaine. She did not smoke. FAMILY HISTORY AND SOCIAL HISTORY:  Family history is significant for maternal grandmother with bipolar mark, but denied substance abuse in the family. She says parents a quite angry at her at this point. She has never , does not have any relationships, does not have any children. She got bachelor awards in history and business at [employer]. 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 She subsequently lost her job and had been unable to maintain one.   Most recently, she had worked part-time at [employer] but has lost that job because of not going in. She had previously lived with her parents but cannot do so now because of the use of substances. She has been told she cannot return to the [facility] because she is using substances. She may be able to return in the future. MENTAL STATUS EXAMINATION:  Revealed her to be alert, oriented white female. Eye contact was fair. Speech was fluent. Mood was depressed with a congruent affect. Thought processing was logical and goal-directed. She denied hallucinations, but she did endorse ideas of reference. She did not appear to be actively hallucinating. She endorsed suicidal ideas, but continued saying that she did not really want to kill herself but was feeling helpless and hopeless. She denied homicidal ideas. IQ was estimated in the normal range. Insight and judgment were influenced by her depressive symptoms and by her drug use. ASSESSMENT:  AXIS I:  Bipolar I disorder, most recent episode depressed, severe with psychosis. Cocaine use disorder, severe. Alcohol use disorder, mild. Alcohol intoxication, resolved. AXIS II:  None. AXIS III:  Menorrhagia, unknown etiology. TREATMENT PLAN:  This patient is admitted voluntarily after self-presentation to emergency room threatening suicide related to ongoing bipolar depression and cocaine use with alcohol intoxication. We will place her on Vistaril as needed and multiple vitamins with iron. She does have a mildly low potassium, we will place her on potassium chloride. She should not have significant withdrawal symptoms from the cocaine or from the alcohol. I am going to write to place her back on the Latuda but at 20 mg daily since she describes problems with the higher dose. She may be able to tolerate the higher dose, but we will start with the lower dose. We will also write for Wellbutrin  mg daily for depression. This should not precipitate a manic episode.   We will need to avoid medications that are high in serotonin. We will initiate naltrexone 50 mg daily since this can be helpful in some cases of decreasing desire to use substances. It is much better with alcohol and opioids, but there are some cases in which it has been helpful with cocaine. We will continue with individual, group and milieu therapies, art and recreation therapy, case management services, social work services, physical examination, laboratory testing as needed. Social Work will do the evaluation, and I would recommend that we try to get her into a long-term substance abuse residential program.  This is somewhat difficult at this time due to the COVID virus, but there are some programs that are accepting patients. ESTIMATED LENGTH OF STAY:  7 days. ANTICIPATED DISPOSITION:  Follow up with outpatient provider or with provider that would be taking care of individuals in a residential substance abuse program if this can be arranged. PROGNOSIS:  Fair.       Gregory Jensen MD      [id number]  D:  05/14/2020 19:19  T:  05/14/2020 20:33  JOB #:  [id number]

## 2020-05-15 NOTE — GROUP NOTE
[facility]  GROUP DOCUMENTATION INDIVIDUAL Group Therapy Note Date: 5/14/2020 Group Start Time: 2030 Group End Time: 2045 Group Topic: Nursing [facility] ADULT CHEM DEP Skye Jonas., RN 
 
[facility]  GROUP DOCUMENTATION GROUP Group Therapy Note Attendees: 3 Attendance: Did not attend  Group. Maryjane Michaud.  [facility]

## 2020-05-15 NOTE — PROGRESS NOTES
Problem: Suicide  Goal: *STG: Remains safe in hospital  Description: Patient will not harm herself or others daily while in hospital.   Outcome: Progressing Towards Goal  Goal: *STG: Attends activities and groups  Description: Patient will attend at least two groups daily while in hospital.   Outcome: Progressing Towards Goal  Goal: *STG/LTG: Complies with medication therapy  Description: Patient will comply with medications daily while hospitalized. Outcome: Progressing Towards Goal     Problem: Falls - Risk of  Goal: *Absence of Falls  Description: Document Gaudencio Martinez Fall Risk and appropriate interventions in the flowsheet daily. Patient will remain fall free during hospital stay   Outcome: Progressing Towards Goal  Note: Fall Risk Interventions:            Medication Interventions: Teach patient to arise slowly                   Problem: Depressed Mood (Adult/Pediatric)  Goal: *STG: Attends activities and groups  Description: Patient will attend at least two groups daily while hospitalized. Outcome: Progressing Towards Goal  Goal: *STG: Remains safe in hospital  Description: Patient will not harm herself daily while in hospital.   Outcome: Progressing Towards Goal     Problem: Crack/Cocaine Withdrawal  Goal: *STG: Attends activities and groups  Description: Pt will attend 3 or more groups daily. She will actively participate and list 3 negative consequences of crack cocaine use. Outcome: Progressing Towards Goal      The patient has been up at will. She has been quiet and staying in her room most of the morning. She is alert and orientated to time, place and situation. She contracts for safety. She has been pleasant and cooperative. Will continue to monitor and provide support as needed.

## 2020-05-15 NOTE — BSMART NOTE
OCCUPATIONAL THERAPY PROGRESS NOTE Group time:1400 The patient did not verbally refuse, but, did not come to group.

## 2020-05-16 ENCOUNTER — APPOINTMENT (OUTPATIENT)
Dept: GENERAL RADIOLOGY | Age: 50
DRG: 753 | End: 2020-05-16
Attending: PSYCHIATRY & NEUROLOGY
Payer: MEDICAID

## 2020-05-16 PROCEDURE — 65220000003 HC RM SEMIPRIVATE PSYCH

## 2020-05-16 PROCEDURE — 74011250637 HC RX REV CODE- 250/637: Performed by: PSYCHIATRY & NEUROLOGY

## 2020-05-16 PROCEDURE — 71046 X-RAY EXAM CHEST 2 VIEWS: CPT

## 2020-05-16 RX ADMIN — THERA TABS 1 TABLET: TAB at 08:36

## 2020-05-16 RX ADMIN — BUPROPION HYDROCHLORIDE 100 MG: 100 TABLET, FILM COATED, EXTENDED RELEASE ORAL at 08:36

## 2020-05-16 RX ADMIN — LURASIDONE HYDROCHLORIDE 20 MG: 20 TABLET, FILM COATED ORAL at 16:36

## 2020-05-16 RX ADMIN — LOPERAMIDE HYDROCHLORIDE 2 MG: 2 CAPSULE ORAL at 16:38

## 2020-05-16 RX ADMIN — HYDROXYZINE PAMOATE 50 MG: 50 CAPSULE ORAL at 18:00

## 2020-05-16 RX ADMIN — NALTREXONE HYDROCHLORIDE 50 MG: 50 TABLET, FILM COATED ORAL at 08:36

## 2020-05-16 NOTE — PROGRESS NOTES
Problem: Suicide  Goal: *STG: Remains safe in hospital  Description: Patient will not harm herself or others daily while in hospital.   Outcome: Progressing Towards Goal  Note: Patient will remain safe in the hospital daily  Goal: *STG: Attends activities and groups  Description: Patient will attend at least two groups daily while in hospital.   Outcome: Progressing Towards Goal  Note: Patient will attend scheduled activities and groups daily  Goal: *STG/LTG: Complies with medication therapy  Description: Patient will comply with medications daily while hospitalized. Outcome: Progressing Towards Goal  Note: Patient will comply with medication therapy daily    Patient has been visible and active in the day area. Patient has been medication compliant, attended groups, and also got some rest during shift. She was very happy to go outside for group and recognized the significance of getting Vit D. No PRNs required during this shift. Denies current thoughts of self harm. Will continue to monitor for safety and support.

## 2020-05-16 NOTE — BH NOTES
Pt has requested visteril for anxiety. Received 50 mg. Will continue to monitor for safety and support.

## 2020-05-16 NOTE — GROUP NOTE
[facility] GROUP DOCUMENTATION INDIVIDUAL Group Therapy Note Date: 5/16/2020 Group Start Time: 0415 Group End Time: 1300 Group Topic: Nursing [facility] 1 ADULT CHEM DEP Maria Del Rosario Lozoya, RN 
 
[facility] GROUP DOCUMENTATION GROUP Group Therapy Note Attendees: 3 Attendance: Attended Patient's Goal:  To relax Interventions/techniques: Challenged, Informed and Validated Follows Directions: Followed directions Interactions: Interacted appropriately Mental Status: Calm Behavior/appearance: Cooperative Goals Achieved: Able to listen to others and Able to give feedback to another Additional Notes:  Patient sat and engaged with peers and then decided to spend some time alone with her thoughts.  
 
Eunice Joyce RN

## 2020-05-16 NOTE — BH NOTES
Patient appears to be anxious and nervous. Patient went down to get a x-ray. Patient appears quiet and calm. Staff noticed patient talking and mumbling to herself. Patient will be monitored for safety.

## 2020-05-16 NOTE — GROUP NOTE
[facility]  GROUP DOCUMENTATION INDIVIDUAL Group Therapy Note Date: 5/15/2020 Group Start Time: 2030 Group End Time: 2045 Group Topic: Nursing [facility] ADULT CHEM DEP Crew, [facility] Group Therapy Note Attendees: 7 Attendance: Attended Patient's Goal: Interventions/techniques: Informed Follows Directions: Followed directions Interactions: Interacted appropriately Mental Status: Calm Behavior/appearance: Cooperative Goals Achieved: Able to listen to others Additional Notes:   
 
 
Hebert Maria

## 2020-05-16 NOTE — PROGRESS NOTES
Behavioral Health Progress Note    Admit Date: 5/13/2020  Hospital day 2    Vitals :   Patient Vitals for the past 8 hrs:   BP Temp Pulse Resp   05/16/20 0800 108/74 97.8 °F (36.6 °C) (!) 118 18     Labs:  No results found for this or any previous visit (from the past 24 hour(s)).   Meds:   Current Facility-Administered Medications   Medication Dose Route Frequency    therapeutic multivitamin (THERAGRAN) tablet 1 Tab  1 Tab Oral DAILY    loperamide (IMODIUM) capsule 2 mg  2 mg Oral Q4H PRN    lurasidone (LATUDA) tablet 20 mg  20 mg Oral DAILY WITH DINNER    buPROPion SR (WELLBUTRIN SR) tablet 100 mg  100 mg Oral DAILY    naltrexone (DEPADE) tablet 50 mg  50 mg Oral DAILY    hydrOXYzine pamoate (VISTARIL) capsule 50 mg  50 mg Oral TID PRN    ibuprofen (MOTRIN) tablet 800 mg  800 mg Oral Q6H PRN    benztropine (COGENTIN) injection 1 mg  1 mg IntraMUSCular Q6H PRN    benztropine (COGENTIN) tablet 1 mg  1 mg Oral Q6H PRN    fluPHENAZine (PROLIXIN) injection 5 mg  5 mg IntraMUSCular Q6H PRN    fluPHENAZine (PROLIXIN) tablet 5 mg  5 mg Oral Q6H PRN      Hospital Problems: Principal Problem:    Bipolar I disorder, most recent episode depressed, severe with psychotic features ([facility] (5/13/2020)    Active Problems:    Cocaine use disorder, severe, dependence ([facility] (5/14/2020)      Alcohol use disorder, mild, abuse (5/14/2020)      Menorrhagia with irregular cycle (5/14/2020)      Hypokalemia (5/14/2020)        Subjective:   Medication side effects: none  none    Mental Status Exam  Sensorium: alert  Orientation: only aware of  time, place and person  Relations: guarded  Eye Contact: appropriate  Appearance: shows no evidence of impairment  Thought Process: normal rate of thoughts and fair abstract reasoning/computation   Thought Content: obsessions    Suicidal: denies   Homicidal: none   Mood: is anxious and unhappy   Affect: stable  Memory: shows no evidence of impairment     Concentration: fair  Abstraction: concrete  Insight: The patient's insight shows no evidence of impairment    OR Fair  Judgement: is psychologically impaired OR  Fair    Assessment/Plan:   improved  Nurses report her to be cooperative. She has been unhappy but denying suicidal ideas. She denies withdrawal symptoms. She has been having diarrhea and had required Imodium which could have either been related to withdrawal or to the use of the potassium. The potassium was discontinued. She had spoken to [facility] [employer] and also to [employer]. They are going to call back to get further information and will need records to be sent by facsimile to them on Monday. She does require a chest x-ray free of tuberculosis. She has had a negative Covid test.  She had panic symptoms last night but was uncertain as to what the cause was other than worrying about her placement. We will continue with medications and have initiated the naltrexone to decrease desire to use her drink and have initiated Wellbutrin for depression. She has taken these this morning and denies complaints so far.   Continue close observation,

## 2020-05-16 NOTE — BH NOTES
Pt was active in activities and pleasant with staff and peers. Pt slept approximately 3 hours of the shift. Will monitor for safety.

## 2020-05-17 PROCEDURE — 74011250637 HC RX REV CODE- 250/637: Performed by: PSYCHIATRY & NEUROLOGY

## 2020-05-17 PROCEDURE — 65220000003 HC RM SEMIPRIVATE PSYCH

## 2020-05-17 RX ADMIN — THERA TABS 1 TABLET: TAB at 08:28

## 2020-05-17 RX ADMIN — BUPROPION HYDROCHLORIDE 100 MG: 100 TABLET, FILM COATED, EXTENDED RELEASE ORAL at 08:28

## 2020-05-17 RX ADMIN — HYDROXYZINE PAMOATE 50 MG: 50 CAPSULE ORAL at 22:24

## 2020-05-17 RX ADMIN — NALTREXONE HYDROCHLORIDE 50 MG: 50 TABLET, FILM COATED ORAL at 08:28

## 2020-05-17 NOTE — PROGRESS NOTES
Behavioral Health Progress Note    Admit Date: 5/13/2020  Hospital day 3    Vitals : No data found. Labs:  No results found for this or any previous visit (from the past 24 hour(s)). Meds:   Current Facility-Administered Medications   Medication Dose Route Frequency    therapeutic multivitamin (THERAGRAN) tablet 1 Tab  1 Tab Oral DAILY    loperamide (IMODIUM) capsule 2 mg  2 mg Oral Q4H PRN    buPROPion SR (WELLBUTRIN SR) tablet 100 mg  100 mg Oral DAILY    naltrexone (DEPADE) tablet 50 mg  50 mg Oral DAILY    hydrOXYzine pamoate (VISTARIL) capsule 50 mg  50 mg Oral TID PRN    ibuprofen (MOTRIN) tablet 800 mg  800 mg Oral Q6H PRN    benztropine (COGENTIN) injection 1 mg  1 mg IntraMUSCular Q6H PRN    benztropine (COGENTIN) tablet 1 mg  1 mg Oral Q6H PRN    fluPHENAZine (PROLIXIN) injection 5 mg  5 mg IntraMUSCular Q6H PRN    fluPHENAZine (PROLIXIN) tablet 5 mg  5 mg Oral Q6H PRN      Hospital Problems: Principal Problem:    Bipolar I disorder, most recent episode depressed, severe with psychotic features ([facility] (5/13/2020)    Active Problems:    Cocaine use disorder, severe, dependence ([facility] (5/14/2020)      Alcohol use disorder, mild, abuse (5/14/2020)      Menorrhagia with irregular cycle (5/14/2020)      Hypokalemia (5/14/2020)        Subjective:   Medication side effects: Arm stiffness cramping and whenever uses the Latuda.   none    Mental Status Exam  Sensorium: alert  Orientation: only aware of  time, place and person  Relations: cooperative  Eye Contact: appropriate  Appearance: shows no evidence of impairment  Thought Process: normal rate of thoughts and fair abstract reasoning/computation   Thought Content: no evidence of impairment   Suicidal: denies   Homicidal: none   Mood: is anxious   Affect: stable  Memory: shows no evidence of impairment     Concentration: fair  Abstraction: abstract  Insight: The patient's insight shows no evidence of impairment    OR Fair  Judgement: shows no evidence of impairment OR  Fair    Assessment/Plan:   improved  Nurses report that she has been cooperative and looking better. She says that she did not like the feeling of her arms being stiff with the Latuda and wished to come off of the Lenell Chambers. Return of any type manic symptoms we would consider using Latuda 10 mg though this is quite low. We will continue with the Wellbutrin and naltrexone. She says that she feels better with this. She did have 2 phone calls yesterday with 1 getting through to her food stamp group and the other to her family so they could make arrangements to take her car home. She wants to get to inpatient substance abuse program.  She had gotten [facility] and also to the [address] [place] [address] [employer] yesterday and both of these groups will be expecting phone call from the  as well as the information being faxed to them.   She is highly motivated to go to treatment program.  Continue close observation

## 2020-05-17 NOTE — GROUP NOTE
[facility] GROUP DOCUMENTATION INDIVIDUAL Group Therapy Note Date: 5/17/2020 Group Start Time: 7826 Group End Time: 1300 Group Topic: Recreational/Music Therapy [facility] 1 ADULT CHEM DEP Donald Estes; Saadia Gifford, RN 
 
[facility] GROUP DOCUMENTATION GROUP Group Therapy Note Attendees: 3 Attendance: Attended Patient's Goal:  Recreational group Interventions/techniques: Promoted peer support Follows Directions: Followed directions Interactions: Interacted appropriately Mental Status: Calm Behavior/appearance: Cooperative Goals Achieved: Able to engage in interactions Additional Notes:   
 
Jacqueline Hodges RN

## 2020-05-17 NOTE — PROGRESS NOTES
Problem: Suicide  Goal: *STG: Remains safe in hospital  Description: Patient will not harm herself or others daily while in hospital.   Outcome: Progressing Towards Goal  Goal: *STG/LTG: Complies with medication therapy  Description: Patient will comply with medications daily while hospitalized. Outcome: Progressing Towards Goal  Note: Patient will comply with medication therapy daily     Problem: Depressed Mood (Adult/Pediatric)  Goal: *STG: Participates in treatment plan  Description: Patient will participate in treatment planning daily while hospitalized. Outcome: Progressing Towards Goal  Note: Patient will participate in treatment plan daily  Goal: *STG: Attends activities and groups  Description: Patient will attend at least two groups daily while hospitalized. Outcome: Progressing Towards Goal  Note: Patient will attend scheduled activities and groups daily   Patient has been minimally visible and active in the day area. She has been in and out of her room. She did go to recreational group outside. She reports that she is excited about possibly going to rehab this week and requested staff inform her  of her plan. Denies current thoughts of self harm. Will continue to monitor for safety and support.

## 2020-05-17 NOTE — GROUP NOTE
JA[facility]  GROUP DOCUMENTATION INDIVIDUAL Group Therapy Note Date: 5/17/2020 Group Start Time: 46 Group End Time: 6868 Group Topic: Nursing [facility] 1 ADULT CHEM Saadia Patten RN 
 
[facility]  GROUP DOCUMENTATION GROUP Group Therapy Note Attendees: 4 Attendance: Did not attend Deidre Morales RN

## 2020-05-18 PROCEDURE — 65220000003 HC RM SEMIPRIVATE PSYCH

## 2020-05-18 PROCEDURE — 74011250637 HC RX REV CODE- 250/637: Performed by: PSYCHIATRY & NEUROLOGY

## 2020-05-18 RX ADMIN — THERA TABS 1 TABLET: TAB at 08:37

## 2020-05-18 RX ADMIN — BUPROPION HYDROCHLORIDE 100 MG: 100 TABLET, FILM COATED, EXTENDED RELEASE ORAL at 08:37

## 2020-05-18 RX ADMIN — NALTREXONE HYDROCHLORIDE 50 MG: 50 TABLET, FILM COATED ORAL at 08:37

## 2020-05-18 RX ADMIN — HYDROXYZINE PAMOATE 50 MG: 50 CAPSULE ORAL at 21:03

## 2020-05-18 NOTE — BSMART NOTE
SOCIAL WORK GROUP THERAPY PROGRESS NOTE Group Time:  10:15am    
 
Group Topic:  Coping Skills    C D Issues Group Participation:   
 
Pt moderately involved during group discussion but remained attentive. Affect some brighter as her focus is starting to be more on \"recovery\" now. Members discussed handout on the role of \"neurotransmitters\" and how they regulate different aspects of one's moods, cognitions & related behavior. Reviewed strategies to keep a \"Journal\" for moods, cognitions, behavior & outcome. Admitted needs to do more of this.

## 2020-05-18 NOTE — BH NOTES
Pt appeared to have slept for 6.25 hours thus far. She is easily aroused during Q15 rounds. Pt asked I'd leave the door open so that she would not wake up so easily. This writer did so, but apparently her roommate closed it on a couple of occasions, which may have frustrated the Pt. Will continue to monitor for safety.

## 2020-05-18 NOTE — PROGRESS NOTES
Behavioral Health Progress Note    Admit Date: 5/13/2020  Hospital day 5    Vitals :   Patient Vitals for the past 8 hrs:   BP Temp Pulse Resp   05/18/20 0836 96/69 96.5 °F (35.8 °C) 98 18     Labs:  No results found for this or any previous visit (from the past 24 hour(s)).   Meds:   Current Facility-Administered Medications   Medication Dose Route Frequency    therapeutic multivitamin (THERAGRAN) tablet 1 Tab  1 Tab Oral DAILY    loperamide (IMODIUM) capsule 2 mg  2 mg Oral Q4H PRN    buPROPion SR (WELLBUTRIN SR) tablet 100 mg  100 mg Oral DAILY    naltrexone (DEPADE) tablet 50 mg  50 mg Oral DAILY    hydrOXYzine pamoate (VISTARIL) capsule 50 mg  50 mg Oral TID PRN    ibuprofen (MOTRIN) tablet 800 mg  800 mg Oral Q6H PRN    benztropine (COGENTIN) injection 1 mg  1 mg IntraMUSCular Q6H PRN    benztropine (COGENTIN) tablet 1 mg  1 mg Oral Q6H PRN    fluPHENAZine (PROLIXIN) injection 5 mg  5 mg IntraMUSCular Q6H PRN    fluPHENAZine (PROLIXIN) tablet 5 mg  5 mg Oral Q6H PRN      Hospital Problems: Principal Problem:    Bipolar I disorder, most recent episode depressed, severe with psychotic features ([facility] [address] [address] (5/13/2020)    Active Problems:    Cocaine use disorder, severe, dependence ([facility] [address] (5/14/2020)      Alcohol use disorder, mild, abuse (5/14/2020)      Menorrhagia with irregular cycle (5/14/2020)      Hypokalemia (5/14/2020)        Subjective:   Medication side effects: none  none    Mental Status Exam  Sensorium: alert  Orientation: only aware of  time, place and person  Relations: cooperative  Eye Contact: appropriate  Appearance: shows no evidence of impairment  Thought Process: normal rate of thoughts and fair abstract reasoning/computation   Thought Content: no evidence of impairment   Suicidal: brief ideas without intent   Homicidal: none   Mood: is anxious and is sad   Affect: stable  Memory: shows no evidence of impairment     Concentration: fair  Abstraction: concrete  Insight: The patient's insight shows no evidence of impairment    OR Fair  Judgement: is psychologically impaired OR  Fair    Assessment/Plan:   not changed  Patient has been on the Wellbutrin for several days and denies medication complaints with this. She had been getting the [place] but discontinued this and is not having the stiffness or shaking which she had been getting with that. She denies feeling manic but does feel somewhat anxious and not taking it. She denies suicidal intent but has had some suicidal ideas. She continues to be extremely anxious about the prospect of what she will do to maintain sobriety and is quite fearful that she will relapse if she heads out the door without going to a treatment facility. She says that she knows she will end up killing herself if she does not stay sober. We will continue with medications as is. The  has set her paperwork to [facility] and to the [address] [facility] [address]. We have not heard back from them as yet.   Continue close observation,

## 2020-05-18 NOTE — BH NOTES
Patient appeared anxious and nervous. Patient stayed to herself this shift. Patient used the phone several times this shift. Patient interacted with other patients

## 2020-05-18 NOTE — BSMART NOTE
OCCUPATIONAL THERAPY PROGRESS NOTE Group Time:  1400 Attendance: The patient attended full group.nce. Participation: The patient participated fully in the activity. Attention: The patient was able to focus on the activity. Interaction: The patient frequently interacts with others. Discussed hopes for rehab, possible return to [address] [place] [address] [address] after that. Identified goal for self to open up more.

## 2020-05-18 NOTE — BSMART NOTE
ART THERAPY GROUP PROGRESS NOTE Group [id number] The patient did not awaken/get up when called for group.

## 2020-05-18 NOTE — BSMART NOTE
SW Contact:   
 
Clinical Summary:  Pt is a 48 y.o. female with history of bipolar disorder who is been having a plan to harm self to include driving her car off a bridge the last couple days along with decreased sleeping increased depression.  Patient has been off her medications for the last 48 hours as well.  She also admits to alcohol and relapse with cocaine as well as family issues.  Patient has not been admitted in a while.  She denies any hallucinations.  Patient was on Latuda at 40 mg but thought that was too high dose because it made her shake.  Patient is also recently prescribed trazodone but has not started taking that is yet.  She was staying at a [employer]. Also recently involved with [facility] for medication management. Assessment /Intervention; Today's contact was UPDATE ON  information obtained from pt whose now focused more on \"RECOVERY\" & d/c plan. Pt A LOT MORE ENERGIZED. She shared contacts she made: [address] [facility] [address] #[phone]  CONTACT \"CARLOS\" FAX #[phone] [place] [facility]:. .  #                                        CONTACT \"REYAN\"   
                         [id number]  OR  [id number] PT CLINICAL FAXED TO \"BOTH\". .. CONFIRMATIONS RECEIVED ! Pt aware this was completed.  & tx team updated.

## 2020-05-19 PROCEDURE — 65220000003 HC RM SEMIPRIVATE PSYCH

## 2020-05-19 PROCEDURE — 74011250637 HC RX REV CODE- 250/637: Performed by: PSYCHIATRY & NEUROLOGY

## 2020-05-19 RX ADMIN — BUPROPION HYDROCHLORIDE 100 MG: 100 TABLET, FILM COATED, EXTENDED RELEASE ORAL at 10:04

## 2020-05-19 RX ADMIN — THERA TABS 1 TABLET: TAB at 10:04

## 2020-05-19 RX ADMIN — NALTREXONE HYDROCHLORIDE 50 MG: 50 TABLET, FILM COATED ORAL at 10:03

## 2020-05-19 NOTE — BSMART NOTE
SW Contact:    
 
Clinical Summary:  Pt is a 48 y.o. female with history of bipolar disorder who is been having a plan to harm self to include driving her car off a bridge the last couple days along with decreased sleeping increased depression.  Patient has been off her medications for the last 48 hours as well.  She also admits to alcohol and relapse with cocaine as well as family issues.  Patient has not been admitted in a while.  She denies any hallucinations.  Patient was on Latuda at 40 mg but thought that was too high dose because it made her shake.  Patient is also recently prescribed trazodone but has not started taking that is yet.  [facility] recently involved with [employer] for medication management. Assessment / Intervention: reviewed with pt update on tx programs that were applied for. Explained NOT / Reasons why not accepted to [place]. Also reminded her of pending request for [facility] and phone Interview on Thursday 5/ 21 / 20  At 3PM. ALSO explained we were researching possibility of TEMP PLACEMENT IN [facility] [address]. .. UNTIL SHE'S ACCEPTED IN PROGRAM.  Pt understood process. 1:45pm Today:  Clinical Faxed To  [facility]. .. [address]  Dr & tx team given update.

## 2020-05-19 NOTE — BSMART NOTE
SOCIAL WORK GROUP THERAPY PROGRESS NOTE Group Time:  10:30am 
 
Group Topic:  Coping Skills    C D Issues Group Participation:   
 
Pt moderately involved during group discussion but remained attentive. Affect flatter with disappointment she wasn't accepted for program in [place]. Emphasis of session was presentation of basic \"CBT\" principles including diagram of the process, as well as list of typical cognitive distortions. She identified overgeneralizing & jumping to conclusions as needing to change. Taught client about relationship between mood disorders & self medicating them.

## 2020-05-19 NOTE — GROUP NOTE
IP  GROUP DOCUMENTATION INDIVIDUAL Group Therapy Note Date: 5/19/2020 Group Start Time: 200 Group End Time: 1900 Group Topic: Nursing [facility] 1 ADULT CHEM DEP Arnett Sandhoff., RN 
 
IP  GROUP DOCUMENTATION GROUP Group Therapy Note Attendees: 4 Attendance: Attended Interventions/techniques: Challenged and Informed Follows Directions: Followed directions Interactions: Interacted appropriately Mental Status: Calm Behavior/appearance: Attentive and Cooperative Goals Achieved: Able to engage in interactions and Able to listen to others Rosa Loomis

## 2020-05-19 NOTE — PROGRESS NOTES
Problem: Suicide  Goal: *STG: Remains safe in hospital  Description: Patient will not harm herself or others daily while in hospital.   Outcome: Progressing Towards Goal     Problem: Suicide  Goal: *STG/LTG: Complies with medication therapy  Description: Patient will comply with medications daily while hospitalized. Outcome: Progressing Towards Goal     Problem: Depressed Mood (Adult/Pediatric)  Goal: *STG: Participates in treatment plan  Description: Patient will participate in treatment planning daily while hospitalized. Outcome: Progressing Towards Goal  Patient noted with bright affect; smiling appropriately in conversation. Out of room in dayroom socializing and participating in unit activities. Denies +SI/HI/AH. Contracts for safety. Compliant with medications and treatment. Will continue to support and offer reassurance.

## 2020-05-19 NOTE — PROGRESS NOTES
Problem: Suicide  Goal: *STG: Remains safe in hospital  Description: Patient will not harm herself or others daily while in hospital.   Outcome: Progressing Towards Goal     Problem: Suicide  Goal: *STG: Attends activities and groups  Description: Patient will attend at least two groups daily while in hospital.   Outcome: Progressing Towards Goal     Problem: Suicide  Goal: *STG/LTG: Complies with medication therapy  Description: Patient will comply with medications daily while hospitalized. Outcome: Progressing Towards Goal   Patient pleasant and cooperative. Smiling appropriately in conversation. States she is calling programs for treatment programs. Attending group activities with good participation. Compliant with medications. Denies feelings of self harm and appears less anxious compared to admission. Will continue to monitor and offer support as needed.

## 2020-05-19 NOTE — PROGRESS NOTES
Behavioral Health Progress Note    Admit Date: 5/13/2020  Hospital day 6    Vitals :   Patient Vitals for the past 8 hrs:   BP Temp Pulse Resp SpO2   05/19/20 0855 102/66 97.3 °F (36.3 °C) 96 20 98 %     Labs:  No results found for this or any previous visit (from the past 24 hour(s)).   Meds:   Current Facility-Administered Medications   Medication Dose Route Frequency    therapeutic multivitamin (THERAGRAN) tablet 1 Tab  1 Tab Oral DAILY    loperamide (IMODIUM) capsule 2 mg  2 mg Oral Q4H PRN    buPROPion SR (WELLBUTRIN SR) tablet 100 mg  100 mg Oral DAILY    naltrexone (DEPADE) tablet 50 mg  50 mg Oral DAILY    hydrOXYzine pamoate (VISTARIL) capsule 50 mg  50 mg Oral TID PRN    ibuprofen (MOTRIN) tablet 800 mg  800 mg Oral Q6H PRN    benztropine (COGENTIN) injection 1 mg  1 mg IntraMUSCular Q6H PRN    benztropine (COGENTIN) tablet 1 mg  1 mg Oral Q6H PRN    fluPHENAZine (PROLIXIN) injection 5 mg  5 mg IntraMUSCular Q6H PRN    fluPHENAZine (PROLIXIN) tablet 5 mg  5 mg Oral Q6H PRN      Hospital Problems: Principal Problem:    Bipolar I disorder, most recent episode depressed, severe with psychotic features ([facility] [address] (5/13/2020)    Active Problems:    Cocaine use disorder, severe, dependence ([facility] [address] (5/14/2020)      Alcohol use disorder, mild, abuse (5/14/2020)      Menorrhagia with irregular cycle (5/14/2020)      Hypokalemia (5/14/2020)        Subjective:   Medication side effects: none  none    Mental Status Exam  Sensorium: alert  Orientation: only aware of  time, place and person  Relations: cooperative  Eye Contact: appropriate  Appearance: shows no evidence of impairment  Thought Process: normal rate of thoughts and fair abstract reasoning/computation   Thought Content: no evidence of impairment   Suicidal: denies   Homicidal: none   Mood: is anxious and unhappy   Affect: stable  Memory: shows no evidence of impairment     Concentration: fair  Abstraction: abstract  Insight: The patient's insight shows no evidence of impairment    OR Fair  Judgement: shows no evidence of impairment OR  Fair    Assessment/Plan:   improved  Nurses report the patient has been cooperative. She continues to be somewhat unhappy worried about where she will go when she leaves the hospital.  She was denied by [facility] treatment [facility] since they thought that she had too much psychiatric issues for them to deal with. We have not heard back about the [address]. I have inquired as to whether she may be a candidate for the [place] crisis stabilization unit as a short-term placement. Have also not heard back in regards to the [facility] program.  She denies hallucinations or delusions. She is [place] for safety not having thoughts of suicide today. Mood was somewhat anxious and unhappy but she is functional at this point. We will continue with medications as is.   Continue close observation,

## 2020-05-19 NOTE — GROUP NOTE
JAIME  GROUP DOCUMENTATION INDIVIDUAL Group Therapy Note Date: 5/18/2020 Group Start Time: 1 Group End Time: 1945 Group Topic: Nursing [facility] 1 ADULT CHEM DEP Joselin Singer, RN 
 
IP  GROUP DOCUMENTATION GROUP Group Therapy Note Attendees: 4 Attendance: Attended Interventions/techniques: Challenged and Informed Follows Directions: Followed directions Interactions: Interacted appropriately Mental Status: Calm Behavior/appearance: Attentive Goals Achieved: Able to engage in interactions and Able to listen to others Hannah Garrison

## 2020-05-19 NOTE — BSMART NOTE
OCCUPATIONAL THERAPY PROGRESS NOTE Group time:8294 The patient declined group. Said her stomach was bothering her. Luzma Alvarez

## 2020-05-20 VITALS
OXYGEN SATURATION: 98 % | SYSTOLIC BLOOD PRESSURE: 100 MMHG | DIASTOLIC BLOOD PRESSURE: 69 MMHG | RESPIRATION RATE: 16 BRPM | TEMPERATURE: 97.2 F | HEART RATE: 76 BPM

## 2020-05-20 PROCEDURE — 74011250637 HC RX REV CODE- 250/637: Performed by: PSYCHIATRY & NEUROLOGY

## 2020-05-20 RX ORDER — BUPROPION HYDROCHLORIDE 100 MG/1
100 TABLET, EXTENDED RELEASE ORAL DAILY
Qty: 30 TAB | Refills: 0 | Status: SHIPPED | OUTPATIENT
Start: 2020-05-21 | End: 2020-06-24

## 2020-05-20 RX ORDER — IBUPROFEN 800 MG/1
800 TABLET ORAL
Qty: 45 TAB | Refills: 0 | Status: SHIPPED | OUTPATIENT
Start: 2020-05-20 | End: 2020-06-24

## 2020-05-20 RX ORDER — THERA TABS 400 MCG
1 TAB ORAL DAILY
Qty: 30 TAB | Refills: 0 | Status: SHIPPED | OUTPATIENT
Start: 2020-05-21 | End: 2020-06-24

## 2020-05-20 RX ORDER — NALTREXONE HYDROCHLORIDE 50 MG/1
50 TABLET, FILM COATED ORAL DAILY
Qty: 30 TAB | Refills: 0 | Status: SHIPPED | OUTPATIENT
Start: 2020-05-21 | End: 2020-06-24

## 2020-05-20 RX ADMIN — NALTREXONE HYDROCHLORIDE 50 MG: 50 TABLET, FILM COATED ORAL at 08:21

## 2020-05-20 RX ADMIN — THERA TABS 1 TABLET: TAB at 08:21

## 2020-05-20 RX ADMIN — BUPROPION HYDROCHLORIDE 100 MG: 100 TABLET, FILM COATED, EXTENDED RELEASE ORAL at 08:21

## 2020-05-20 NOTE — DISCHARGE INSTRUCTIONS
BEHAVIORAL HEALTH NURSING DISCHARGE NOTE      The following personal items collected during your admission are returned to you:   Dental Appliance:    Vision: Visual Aid: None  Hearing Aid:    Jewelry:    Clothing: Clothing: Pants, Shirt  Other Valuables: Other Valuables: Cell Phone, Other (comment)(phone , 2 books, sunglasses, purse)  Valuables sent to safe:        PATIENT INSTRUCTIONS:    Patient armband removed and shredded    The discharge information has been reviewed with the patient. The patient verbalized understanding.     Phone numbers to remember:  [address] : [address] [facility] Emergency [facility]:  [phone]  Suicide Prevention: [phone]

## 2020-05-20 NOTE — BH NOTES
The documentation for this period is being entered following the guidelines as defined in the [facility] downtime policy by Sherman Dc (KAYA). Pt downtime sheets are in patient chart.

## 2020-05-20 NOTE — BSMART NOTE
Pt.'s case was discussed in treatment team.  Covering SW informed the team pt. Will be discharged to [place] [id number] in [facility] today. Pt. Has a phone assessment scheduled with [facility] on 5/20/20 @ 3:00 pm. ALYCIA discussed dc with pt. Pt. denies ideations and hallucinations. Pt. informed Manuel Ohara will pick pt up this morning.

## 2020-05-20 NOTE — BH NOTES
Reviewed discharge paperwork with patient, answered all questions, and removed armband. Pt stable and in no distress. Looking forward to rehabilitation.  Accompanied downstairs to her transportation for discharge

## 2020-05-20 NOTE — BH NOTES
Patient has been in day room most of day shift, participating in groups and interacting with staff and peers. Patient has been appropriate with interactions and continues to voice wanting to attend long-term substance abuse program.  Patient voiced disappointment in denial by [place] but also voiced \"things happen for a reason. \"  Patient affect bright and appropriate during this shift. Patient performed ADL's and has been complaint with unit guidelines, free from falls and harm. Patient compliant with scheduled medications. Patient encouraged to \"keep fight'n for it, sometimes when we've been through stuff like this a few times, life makes us really work for it. .. but, that makes it more valuable, right? \"  Patient agreed and has continued to voice desire for ongoing recovery services. Will continue to monitor and provide reassurance and encouragement as appropriate.

## 2020-05-20 NOTE — DISCHARGE SUMMARY
[facility]    Name:  Raj Jenkins  MR#:   [id number]  :  1970  ACCOUNT #:  [de-identified]  ADMIT DATE:  2020  DISCHARGE DATE:  2020    IDENTIFYING DATA:  The patient had presented as a 61-year-old single white female, resident of [place], [place], who had been living in an [address]. She was unemployed and insured by [facility]. She was admitted after presentation to [facility] with a history of bipolar disorder, having thoughts to drive her car and wreck it. She had decreased sleep, increased depression, and had been off of her medicines for bipolar disorder for 48 hours. She had been drinking and using cocaine. She was supposed to be on Latuda 40 mg with breakfast, but thought it was too high and was making her shake. She had previously been prescribed trazodone and was not taking it. She had endorsed continued suicidal ideas and inability to contract for safety. She was screened by telepsychiatry and recommended admission. There were no notes about prior admissions other than notes about having seen psychiatrist, Lela Martinez MD, in the past.  She had presented to [facility] with suicidality and a description having been on Lexapro and lithium on 2019 and was admitted to [facility]. She did report bipolar symptoms, cocaine use for greater than 10 years with prior admissions to [facility]. She had been on lithium, Depakote, Geodon, and Abilify and at one time had seen Dr. Christine Mas, psychiatrist, but then lost her job and her insurance. She had forged a check and went to detention for one year about seven years ago, then violated probation and went back to detention for six months. She had a history of manic episodes of anger, irritability, and agitation with episodes of mild euphoria. At one point, she believed the TV was talking about her.   She had paulette benavidez at the [facility] and relapsed to cocaine, thereafter ended up presenting to the hospital.    Longest period of sobriety was about 12 months. She had worked 12-step program, but had not maintained sobriety. She had attended an outpatient program of probation or parole for six months, but said it was not very successful since none of the people there seemed willing to work a program.  She was drinking about 12 ounces of wine a day. Laboratory testing in the emergency room revealed a CBC with a low normal hemoglobin 12.4 g/dL with the remainder normal, dilute urine with 0-3 wbc's, mainly normal comprehensive metabolic panel with a low potassium 3.3 mmol/L, minimally low calcium 8.4 mg/dL, normal lipid panel, negative hCG, urine drug screen positive for cocaine, alcohol level showing intoxication with a blood level of 98 mg/dL. COVID-19 test was negative. A screening chest x-ray was negative for any evidence of tuberculosis or pneumonia. HOSPITAL COURSE:  The patient was admitted to the locked adult unit where she was afforded individual, group, and milieu therapies. Vital signs were normal.  While in the hospital, she was treated with the medication of potassium which caused intestinal problems and had to be discontinued. She was instead placed on one banana twice a day as well as orange juice. She was placed on bupropion  mg daily; hydroxyzine p.r.n. anxiety, receiving a total of five doses; Imodium 2 mg which she received several doses over first several days; naltrexone 50 mg daily; multiple vitamins daily. She had been tried on the [place] which made her sleepy as an outpatient, so she was placed on the lower dose at 20 mg. She continued to have difficulties with it. This was discontinued and she showed no evidence of any manic symptoms at all. She was aware that if she did have any of that, the Latuda could be resumed.   Mood improved in the structure of the hospital.  She was denying further homicidal or suicidal ideas after several days. She was voicing desire to maintain sobriety and felt the need to go to a substance abuse program in order to maintain this since she had failed as an outpatient. We had given her several referrals and the patient was to have a phone call to the [facility] in several days for her screening interview. She was no longer meeting acute care criteria and was accepted to the [address] [place] [address] Unit for further low-level stabilization. CONDITION ON DISCHARGE:  Fair. PROGNOSIS:  Fair. ASSESSMENT:  AXIS I:  Bipolar I disorder, most recent episode depressed, severe, with psychosis. Cocaine use disorder, severe. Alcohol use disorder, mild. Alcohol intoxication, resolved. AXIS II:  None. AXIS III:  Menorrhagia, unknown etiology. DISPOSITION/FOLLOWUP PLANS:  Discharged to [facility] for transfer to the [address] [place] [address] Unit. The patient has a screening interview on 05/22/2020 for evaluation and placement into the [facility] substance abuse residential treatment. It is anticipated she will follow up back with physicians in the [place] area for her psychiatric care and substance abuse treatment after residential care. Follow up with own gynecologist upon return. MEDICATIONS:  Bupropion  mg daily, #30, no refill; naltrexone 50 mg p.o. daily, #30, no refill; multiple vitamin one p.o. daily, #30, no refill.       Fadia Henson MD      [id number]  D:  05/20/2020 10:02  T:  05/20/2020 17:19  JOB #:  [id number]

## 2020-06-24 ENCOUNTER — HOSPITAL ENCOUNTER (EMERGENCY)
Age: 50
Discharge: HOME | End: 2020-06-24
Attending: EMERGENCY MEDICINE
Payer: MEDICAID

## 2020-06-24 VITALS
WEIGHT: 128 LBS | HEIGHT: 62 IN | SYSTOLIC BLOOD PRESSURE: 126 MMHG | DIASTOLIC BLOOD PRESSURE: 82 MMHG | HEART RATE: 92 BPM | RESPIRATION RATE: 16 BRPM | TEMPERATURE: 98 F | BODY MASS INDEX: 23.55 KG/M2 | OXYGEN SATURATION: 100 %

## 2020-06-24 DIAGNOSIS — R35.0 URINARY FREQUENCY: Primary | ICD-10-CM

## 2020-06-24 DIAGNOSIS — N30.01 ACUTE CYSTITIS WITH HEMATURIA: ICD-10-CM

## 2020-06-24 DIAGNOSIS — Z76.0 MEDICATION REFILL: ICD-10-CM

## 2020-06-24 LAB
APPEARANCE UR: CLEAR
BACTERIA URNS QL MICRO: ABNORMAL /HPF
BILIRUB UR QL: NEGATIVE
COLOR UR: YELLOW
EPITH CASTS URNS QL MICRO: ABNORMAL /LPF (ref 0–5)
GLUCOSE UR STRIP.AUTO-MCNC: NEGATIVE MG/DL
HCG UR QL: NEGATIVE
HGB UR QL STRIP: ABNORMAL
KETONES UR QL STRIP.AUTO: NEGATIVE MG/DL
LEUKOCYTE ESTERASE UR QL STRIP.AUTO: NEGATIVE
NITRITE UR QL STRIP.AUTO: NEGATIVE
PH UR STRIP: 7 [PH] (ref 5–8)
PROT UR STRIP-MCNC: NEGATIVE MG/DL
RBC #/AREA URNS HPF: ABNORMAL /HPF (ref 0–5)
SP GR UR REFRACTOMETRY: 1 (ref 1–1.03)
UROBILINOGEN UR QL STRIP.AUTO: 0.2 EU/DL (ref 0.2–1)
WBC URNS QL MICRO: ABNORMAL /HPF (ref 0–4)

## 2020-06-24 PROCEDURE — 87086 URINE CULTURE/COLONY COUNT: CPT

## 2020-06-24 PROCEDURE — 99283 EMERGENCY DEPT VISIT LOW MDM: CPT

## 2020-06-24 PROCEDURE — 81025 URINE PREGNANCY TEST: CPT

## 2020-06-24 PROCEDURE — 81001 URINALYSIS AUTO W/SCOPE: CPT

## 2020-06-24 RX ORDER — BUPROPION HYDROCHLORIDE 100 MG/1
100 TABLET, EXTENDED RELEASE ORAL DAILY
Qty: 30 TAB | Refills: 0 | Status: SHIPPED | OUTPATIENT
Start: 2020-06-24

## 2020-06-24 RX ORDER — CEPHALEXIN 500 MG/1
500 CAPSULE ORAL 2 TIMES DAILY
Qty: 20 CAP | Refills: 0 | Status: SHIPPED | OUTPATIENT
Start: 2020-06-24 | End: 2020-07-04

## 2020-06-24 NOTE — PROGRESS NOTES
referral to assist pt finding psychiatric provider that will take her insurance. Pt has [employer]. Placed a call to [facility] and spoke with her. She states that [facility]. Pt provided with this information with contact #. Pt was also provided with [facility] Intake information.

## 2020-06-24 NOTE — ED TRIAGE NOTES
Needs refill for bupropion and psych referral. Was at [facility] recently on psych floor

## 2020-06-24 NOTE — ED PROVIDER NOTES
EMERGENCY DEPARTMENT HISTORY AND PHYSICAL EXAM    11:16 AM      Date: 6/24/2020  Patient Name: Jarrell Cates    History of Presenting Illness     Chief Complaint   Patient presents with    Medication Refill         History Provided By: Patient    Additional History (Context): Jarrell Cates is a 48 y.o. female with hx of depression, bipolar disorder, anxiety, anemia who presents for multiple complaints. Patient requesting refill for her Wellbutrin, last dose was 2 days ago. Patient reports she has not been able to get in with a psychiatrist and this medication seems to be helping her. Patient denies any suicidal thoughts at this time, depression, homicidal ideation. Patient also complaining of frequency, reports she thinks is from her anxiety. Patient denies any low back pain, pain with urination, burning sensation. Patient denies any vaginal discharge, painful intercourse. Patient denies any shortness of breath, chest pain, dizziness, fevers, headaches. PCP: None    Current Outpatient Medications   Medication Sig Dispense Refill    buPROPion SR (WELLBUTRIN SR) 100 mg SR tablet Take 1 Tab by mouth daily. Indications: anxiousness associated with depression 30 Tab 0    cephALEXin (Keflex) 500 mg capsule Take 1 Cap by mouth two (2) times a day for 10 days. 20 Cap 0       Past History     Past Medical History:  Past Medical History:   Diagnosis Date    Anemia     Bipolar 1 disorder ([address]     Depression        Past Surgical History:  History reviewed. No pertinent surgical history. Family History:  History reviewed. No pertinent family history. Social History:  Social History     Tobacco Use    Smoking status: Never Smoker    Smokeless tobacco: Never Used   Substance Use Topics    Alcohol use: Yes    Drug use: Yes     Types: Cocaine       Allergies:   Allergies   Allergen Reactions    Erythromycin Nausea and Vomiting    Erythromycin Nausea Only         Review of Systems       Review of Systems Constitutional: Negative for chills and fever. Respiratory: Negative for shortness of breath. Cardiovascular: Negative for chest pain. Gastrointestinal: Negative for abdominal pain, nausea and vomiting. Genitourinary: Positive for frequency and urgency. Negative for decreased urine volume, difficulty urinating, dyspareunia, dysuria, pelvic pain, vaginal bleeding, vaginal discharge and vaginal pain. Skin: Negative for rash. Neurological: Negative for weakness. Psychiatric/Behavioral: Negative for behavioral problems, confusion, dysphoric mood, hallucinations, sleep disturbance and suicidal ideas. The patient is nervous/anxious. The patient is not hyperactive. All other systems reviewed and are negative. Physical Exam     Visit Vitals  /82 (BP 1 Location: Right arm, BP Patient Position: At rest)   Pulse 92   Temp 98 °F (36.7 °C)   Resp 16   Ht 5' 2\" (1.575 m)   Wt 58.1 kg (128 lb)   LMP 06/03/2020   SpO2 100%   BMI 23.41 kg/m²         Physical Exam  Vitals signs reviewed. Constitutional:       General: She is not in acute distress. Appearance: Normal appearance. She is well-developed. She is not ill-appearing or toxic-appearing. Comments: Patient in no acute distress. HENT:      Head: Normocephalic and atraumatic. Eyes:      Conjunctiva/sclera: Conjunctivae normal.      Pupils: Pupils are equal, round, and reactive to light. Neck:      Musculoskeletal: Normal range of motion. Trachea: Trachea normal.   Cardiovascular:      Rate and Rhythm: Normal rate and regular rhythm. Pulmonary:      Effort: Pulmonary effort is normal.      Breath sounds: Normal breath sounds and air entry. Abdominal:      General: Bowel sounds are normal. There is no distension or abdominal bruit. Palpations: Abdomen is soft. Abdomen is not rigid. There is no shifting dullness, fluid wave, mass or pulsatile mass. Tenderness: There is generalized abdominal tenderness.  There is rebound. There is no guarding. Negative signs include Smith's sign and McBurney's sign. Neurological:      General: No focal deficit present. Mental Status: She is alert and oriented to person, place, and time. Mental status is at baseline. Psychiatric:         Attention and Perception: Attention and perception normal. She is attentive. She does not perceive auditory or visual hallucinations. Mood and Affect: Mood normal.         Speech: Speech normal.         Behavior: Behavior normal. Behavior is cooperative. Thought Content: Thought content normal. Thought content does not include homicidal or suicidal ideation. Thought content does not include homicidal or suicidal plan. Cognition and Memory: Cognition normal.         Judgment: Judgment normal.      Comments: Patient denies any suicidal ideation. Patient calm. Diagnostic Study Results     Labs -  Recent Results (from the past 12 hour(s))   URINALYSIS W/ RFLX MICROSCOPIC    Collection Time: 06/24/20 11:30 AM   Result Value Ref Range    Color YELLOW      Appearance CLEAR      Specific gravity 1.005 1.005 - 1.030      pH (UA) 7.0 5.0 - 8.0      Protein Negative NEG mg/dL    Glucose Negative NEG mg/dL    Ketone Negative NEG mg/dL    Bilirubin Negative NEG      Blood LARGE (A) NEG      Urobilinogen 0.2 0.2 - 1.0 EU/dL    Nitrites Negative NEG      Leukocyte Esterase Negative NEG     HCG URINE, QL    Collection Time: 06/24/20 11:30 AM   Result Value Ref Range    HCG urine, QL Negative NEG     URINE MICROSCOPIC ONLY    Collection Time: 06/24/20 11:30 AM   Result Value Ref Range    WBC 4 to 11 0 - 4 /hpf    RBC 21 to 35 0 - 5 /hpf    Epithelial cells 2+ 0 - 5 /lpf    Bacteria 1+ (A) NEG /hpf       Radiologic Studies -   No orders to display         Medical Decision Making   I am the first provider for this patient.     I reviewed the vital signs, available nursing notes, past medical history, past surgical history, family history and social history. Vital Signs-Reviewed the patient's vital signs. Records Reviewed: Nursing Notes and Old Medical Records (Time of Review: 11:16 AM)    ED Course: Progress Notes, Reevaluation, and Consults:      Provider Notes (Medical Decision Making):   Plan to refill patient's Wellbutrin. Will have  see patient to help her set up psych appointment. Will check patient's urine for possible UTI. Patient has no concerns at this time for STDs. Patient denies any vaginal discharge. Patient denies any homicidal or  suicidal ideation. Patient reports the Wellbutrin is helping her. Pt is calm during visit does not look in distress. Patient given information to follow up with [facility] and given info to make appointment as soon as possible. Dawson Cheung RN from [employer]. UA: Some WBC present in urine. Patient is currently on her menstrual cycle which explains the blood in urine. Will treat pt for UTI due to symptoms of frequency. Pt to follow up with her PCP or return to ED as needed. 12:35 PM  Mariana Balderrama's  results have been reviewed with her. She has been counseled regarding her diagnosis. She verbally conveys understanding and agreement of the signs, symptoms, diagnosis, treatment and prognosis and additionally agrees to follow up as recommended with her PCP and [facility]. She also agrees with the care-plan and conveys that all of her questions have been answered. I have also put together some discharge instructions for her that include: 1) educational information regarding their diagnosis, 2) how to care for their diagnosis at home, as well a 3) list of reasons why they would want to return to the ED prior to their follow-up appointment, should their condition change. Diagnosis     Clinical Impression:   1. Urinary frequency    2. Acute cystitis with hematuria    3.  Medication refill        Disposition: home     Follow-up Information     Follow up With Specialties Details Why Contact Info    [employer]  Schedule an appointment as soon as possible for a visit today  [phone]  [phone]    [facility] EMERGENCY DEPT Emergency Medicine  If symptoms worsen [address] [facility] [place]     Natalia  Schedule an appointment as soon as possible for a visit in 1 week  [address] [facility]   As needed [address] [place] [address] E  [phone]           Patient's Medications   Start Taking    CEPHALEXIN (KEFLEX) 500 MG CAPSULE    Take 1 Cap by mouth two (2) times a day for 10 days. Continue Taking    No medications on file   These Medications have changed    Modified Medication Previous Medication    BUPROPION SR (WELLBUTRIN SR) 100 MG SR TABLET buPROPion SR (WELLBUTRIN SR) 100 mg SR tablet       Take 1 Tab by mouth daily. Indications: anxiousness associated with depression    Take 1 Tab by mouth daily. Indications: anxiousness associated with depression   Stop Taking    IBUPROFEN (MOTRIN) 800 MG TABLET    Take 1 Tab by mouth every six (6) hours as needed for Pain. Indications: pain    NALTREXONE (DEPADE) 50 MG TABLET    Take 1 Tab by mouth daily. Indications: substance abuse relapse prevention    THERAPEUTIC MULTIVITAMIN (THERAGRAN) TABLET    Take 1 Tab by mouth daily. Indications: treatment to prevent vitamin deficiency       Dictation disclaimer:  Please note that this dictation was completed with Net[employer], the computer voice recognition software. Quite often unanticipated grammatical, syntax, homophones, and other interpretive errors are inadvertently transcribed by the computer software. Please disregard these errors. Please excuse any errors that have escaped final proofreading.

## 2020-06-24 NOTE — DISCHARGE INSTRUCTIONS

## 2020-06-26 LAB
BACTERIA SPEC CULT: NORMAL
CC UR VC: NORMAL
SERVICE CMNT-IMP: NORMAL

## 2020-11-25 ENCOUNTER — HOSPITAL ENCOUNTER (EMERGENCY)
Age: 50
Discharge: HOME | End: 2020-11-25
Attending: EMERGENCY MEDICINE | Admitting: EMERGENCY MEDICINE
Payer: MEDICAID

## 2020-11-25 VITALS
HEART RATE: 101 BPM | HEIGHT: 63 IN | RESPIRATION RATE: 18 BRPM | OXYGEN SATURATION: 100 % | DIASTOLIC BLOOD PRESSURE: 100 MMHG | SYSTOLIC BLOOD PRESSURE: 133 MMHG | WEIGHT: 135 LBS | TEMPERATURE: 97.9 F | BODY MASS INDEX: 23.92 KG/M2

## 2020-11-25 DIAGNOSIS — Z76.0 ENCOUNTER FOR MEDICATION REFILL: Primary | ICD-10-CM

## 2020-11-25 PROCEDURE — 99281 EMR DPT VST MAYX REQ PHY/QHP: CPT

## 2020-11-25 NOTE — ED TRIAGE NOTES
Pt arrives ambulatory through triage for refill of medication. Pt states she has moved and has not been able to get her medication for bipolar. Was prescribed Wellbutrin. Last dose was mid sept.

## 2020-11-25 NOTE — ED PROVIDER NOTES
[facility] EMERGENCY DEPT    Date: 11/25/2020  Patient Name: Skye Nolen    History of Presenting Illness     Chief Complaint   Patient presents with    Medication Refill     48 y.o. female with a past medical history of Bipolar disorder and Depression presents to the ED requesting a medication refill. Patient states she has been off of her Wellbutrin for 1 month and is requesting a refill today. She states she does not have any suicidal ideation, homicidal ideation, or thoughts of self-harm. She does not currently have a psychiatrist and indicates that she did not follow-up with the [facility] in June when she was directed to do so. No symptoms at the current time. Patient denies any other associated signs or symptoms. Patient denies any other complaints. Nursing notes regarding the HPI and triage nursing notes were reviewed. Prior medical records were reviewed. Current Outpatient Medications   Medication Sig Dispense Refill    buPROPion SR (WELLBUTRIN SR) 100 mg SR tablet Take 1 Tab by mouth daily. Indications: anxiousness associated with depression 30 Tab 0       Past History     Past Medical History:  Past Medical History:   Diagnosis Date    Anemia     Bipolar 1 disorder ([address]     Depression        Past Surgical History:  History reviewed. No pertinent surgical history. Family History:  History reviewed. No pertinent family history. Social History:  Social History     Tobacco Use    Smoking status: Never Smoker    Smokeless tobacco: Never Used   Substance Use Topics    Alcohol use: Yes    Drug use: Yes     Types: Cocaine       Allergies: Allergies   Allergen Reactions    Erythromycin Nausea and Vomiting    Erythromycin Nausea Only       Patient's primary care provider (as noted in EPIC): Other, MD Chiquita    Review of Systems   Constitutional:  Denies malaise, fever, chills. Head:  Denies injury.    Neuro:  Denies headache, LOC, dizziness, neurologic symptoms/deficits/paresthesias. Skin: Denies injury, rash, itching or skin changes. Psych: Denies HI, SI. All other systems negative as reviewed. Visit Vitals  BP (!) 133/100 (BP 1 Location: Right arm, BP Patient Position: At rest)   Pulse (!) 101   Temp 97.9 °F (36.6 °C)   Resp 18   Ht 5' 3\" (1.6 m)   Wt 61.2 kg (135 lb)   SpO2 100%   BMI 23.91 kg/m²       PHYSICAL EXAM:    CONSTITUTIONAL:  Alert, in no apparent distress;  well developed;  well nourished. HEAD:  Normocephalic, atraumatic. EYES:  EOMI. Non-icteric sclera. Normal conjunctiva. NECK:  Supple  RESPIRATORY:  Chest clear, equal breath sounds, good air movement. CARDIOVASCULAR:  Regular rate and rhythm. No murmurs, rubs, or gallops. GI:  Normal bowel sounds, abdomen soft and non-tender. No rebound or guarding. BACK:  Non-tender. UPPER EXT:  Normal inspection. LOWER EXT:  No edema, no calf tenderness. Distal pulses intact. NEURO:  Moves all four extremities, and grossly normal motor exam.  SKIN:  No rashes;  Normal for age. PSYCH:  Alert and normal affect. Denies HI, SI.     IMPRESSION AND MEDICAL DECISION MAKING:  Based upon the patient's presentation with noted HPI and PE, along with the work up done in the emergency department, the patient is seeking a medication refill. Patient is requesting Wellbutrin today, she has been off of it for 1 month. She did not f/u with [facility] in June of this year as directed. I informed her that we are unable to prescribe this today as she has been off of it for quite some time and she needs a psychiatrist to write it for her. She became angry and asked to speak with my attending. Dr. Kerrin Babinski has discussed this with her as well and has informed her that she needs to be under the continued care of a psychiatrist for her to be on this medication. Patient is not suicidal or homicidal, she may return for any acute worsening, otherwise follow-up with the [facility]. Diagnosis:   1.  Encounter for medication refill      Disposition: Discharge    Follow-up Information     Follow up With Specialties Details Why Contact Info    [facility]  In 3 days  [phone]  [phone]    [facility] EMERGENCY DEPT Emergency Medicine  If symptoms worsen [address]  [phone]          Patient's Medications   Start Taking    No medications on file   Continue Taking    BUPROPION SR (WELLBUTRIN SR) 100 MG SR TABLET    Take 1 Tab by mouth daily.  Indications: anxiousness associated with depression   These Medications have changed    No medications on file   Stop Taking    No medications on file     KIANNA Shannon

## 2020-12-08 ENCOUNTER — TRANSCRIBE ORDER (OUTPATIENT)
Dept: SCHEDULING | Age: 50
End: 2020-12-08

## 2020-12-08 DIAGNOSIS — Z12.31 VISIT FOR SCREENING MAMMOGRAM: Primary | ICD-10-CM
